# Patient Record
Sex: FEMALE | URBAN - METROPOLITAN AREA
[De-identification: names, ages, dates, MRNs, and addresses within clinical notes are randomized per-mention and may not be internally consistent; named-entity substitution may affect disease eponyms.]

---

## 2021-12-21 ENCOUNTER — EMPLOYEE HEALTH (OUTPATIENT)
Dept: OTHER | Facility: HOSPITAL | Age: 31
End: 2021-12-21
Attending: PREVENTIVE MEDICINE

## 2021-12-21 DIAGNOSIS — Z11.1 SCREENING-PULMONARY TB: Primary | ICD-10-CM

## 2021-12-21 PROCEDURE — 86480 TB TEST CELL IMMUN MEASURE: CPT

## 2022-05-26 ENCOUNTER — TELEPHONE (OUTPATIENT)
Dept: FAMILY MEDICINE CLINIC | Facility: CLINIC | Age: 32
End: 2022-05-26

## 2022-05-26 NOTE — TELEPHONE ENCOUNTER
I spoke with patient regarding NO SHOW status for office visit on 05/26/2022 with Dr. Douglas Cordero. I informed patient our office has a $24.79 NO SHOW FEE POLICY so we ask that you call 24 hours before your appt time. You can also use my-chart to cancel appt before 48 hours before your appointment. Patient will be charged $40.00 for any future NO SHOW appointments. Patient verbalized understanding and agrees.

## 2022-06-01 ENCOUNTER — TELEPHONE (OUTPATIENT)
Dept: FAMILY MEDICINE CLINIC | Facility: CLINIC | Age: 32
End: 2022-06-01

## 2022-06-01 NOTE — TELEPHONE ENCOUNTER
LMOM for Pt regarding NO SHOW status for office visit on 06/01/2022 with Dr. Sybil Lorenzo. I informed patient our office has a $08.83 NO SHOW FEE POLICY so we ask that you call 24 hours before your appt time. You can also use my-chart to cancel appt before 48 hours before your appointment. Patient will be charged $40.00 for any future NO SHOW appointments.

## 2022-10-27 ENCOUNTER — OFFICE VISIT (OUTPATIENT)
Dept: FAMILY MEDICINE CLINIC | Facility: CLINIC | Age: 32
End: 2022-10-27
Payer: COMMERCIAL

## 2022-10-27 VITALS
BODY MASS INDEX: 30.91 KG/M2 | SYSTOLIC BLOOD PRESSURE: 122 MMHG | DIASTOLIC BLOOD PRESSURE: 76 MMHG | WEIGHT: 168 LBS | HEART RATE: 80 BPM | TEMPERATURE: 98 F | RESPIRATION RATE: 18 BRPM | HEIGHT: 62 IN | OXYGEN SATURATION: 99 %

## 2022-10-27 DIAGNOSIS — H65.93 BILATERAL NON-SUPPURATIVE OTITIS MEDIA: Primary | ICD-10-CM

## 2022-10-27 PROCEDURE — 3008F BODY MASS INDEX DOCD: CPT | Performed by: NURSE PRACTITIONER

## 2022-10-27 PROCEDURE — 99213 OFFICE O/P EST LOW 20 MIN: CPT | Performed by: NURSE PRACTITIONER

## 2022-10-27 PROCEDURE — 3078F DIAST BP <80 MM HG: CPT | Performed by: NURSE PRACTITIONER

## 2022-10-27 PROCEDURE — 3074F SYST BP LT 130 MM HG: CPT | Performed by: NURSE PRACTITIONER

## 2022-10-27 RX ORDER — AMOXICILLIN 875 MG/1
875 TABLET, COATED ORAL 2 TIMES DAILY
Qty: 20 TABLET | Refills: 0 | Status: SHIPPED | OUTPATIENT
Start: 2022-10-27 | End: 2022-10-27

## 2022-10-27 RX ORDER — AMOXICILLIN 875 MG/1
875 TABLET, COATED ORAL 2 TIMES DAILY
Qty: 20 TABLET | Refills: 0 | Status: SHIPPED | OUTPATIENT
Start: 2022-10-27 | End: 2022-11-06

## 2022-11-28 ENCOUNTER — IMMUNIZATION (OUTPATIENT)
Dept: FAMILY MEDICINE CLINIC | Facility: CLINIC | Age: 32
End: 2022-11-28
Payer: COMMERCIAL

## 2022-11-28 PROCEDURE — 90471 IMMUNIZATION ADMIN: CPT | Performed by: NURSE PRACTITIONER

## 2022-11-28 PROCEDURE — 90686 IIV4 VACC NO PRSV 0.5 ML IM: CPT | Performed by: NURSE PRACTITIONER

## 2022-12-15 ENCOUNTER — TELEPHONE (OUTPATIENT)
Dept: FAMILY MEDICINE CLINIC | Facility: CLINIC | Age: 32
End: 2022-12-15

## 2022-12-15 DIAGNOSIS — N76.0 ACUTE VAGINITIS: Primary | ICD-10-CM

## 2022-12-15 RX ORDER — FLUCONAZOLE 150 MG/1
150 TABLET ORAL ONCE
Qty: 1 TABLET | Refills: 1 | Status: SHIPPED | OUTPATIENT
Start: 2022-12-15 | End: 2022-12-15

## 2023-11-17 ENCOUNTER — IMMUNIZATION (OUTPATIENT)
Dept: FAMILY MEDICINE CLINIC | Facility: CLINIC | Age: 33
End: 2023-11-17
Payer: COMMERCIAL

## 2023-11-17 DIAGNOSIS — Z23 NEEDS FLU SHOT: Primary | ICD-10-CM

## 2023-11-17 PROCEDURE — 90686 IIV4 VACC NO PRSV 0.5 ML IM: CPT | Performed by: FAMILY MEDICINE

## 2023-11-17 PROCEDURE — 90471 IMMUNIZATION ADMIN: CPT | Performed by: FAMILY MEDICINE

## 2024-04-24 ENCOUNTER — OFFICE VISIT (OUTPATIENT)
Dept: FAMILY MEDICINE CLINIC | Facility: CLINIC | Age: 34
End: 2024-04-24
Payer: COMMERCIAL

## 2024-04-24 VITALS
OXYGEN SATURATION: 99 % | SYSTOLIC BLOOD PRESSURE: 126 MMHG | BODY MASS INDEX: 38 KG/M2 | TEMPERATURE: 98 F | RESPIRATION RATE: 16 BRPM | DIASTOLIC BLOOD PRESSURE: 80 MMHG | HEART RATE: 121 BPM | WEIGHT: 209.81 LBS

## 2024-04-24 DIAGNOSIS — Z13.6 SCREENING FOR CARDIOVASCULAR CONDITION: ICD-10-CM

## 2024-04-24 DIAGNOSIS — R03.0 ELEVATED BP WITHOUT DIAGNOSIS OF HYPERTENSION: Primary | ICD-10-CM

## 2024-04-24 PROCEDURE — 99213 OFFICE O/P EST LOW 20 MIN: CPT | Performed by: FAMILY MEDICINE

## 2024-04-24 NOTE — PROGRESS NOTES
HPI:     Jacquelyn Jerry is a 33 year old female presents for    F/u on high blood pressure.  She is a new patient to the clinic.  He has not seen a physician in about 3 or 4 years.  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  Has noted to use a medium size cuff and her BP has been in the 140/90s range.  When they checked using a purple cuff earlier it was in the 130s today and is normal here.  Overall feels well.  Does admit to eating a high sodium diet.  Has already started to cut back on this.  Plans on losing weight as well.  Has not had blood work in several years and would like this done.  Is unsure on her Tdap status but will look into this.  Last Pap was over 3 years ago.  Thinks she has had 1 abnormal previously but have been normal since.      Medications (Active prior to today's visit):  Current Outpatient Medications   Medication Sig Dispense Refill    valACYclovir 1 G Oral Tab TAKE 1 TABLET BY MOUTH TWICE A DAY ONCE (Patient not taking: Reported on 4/24/2024)      promethazine 6.25 MG/5ML Oral Syrup Take 10 mL by mouth every 6 (six) hours as needed. (Patient not taking: Reported on 4/24/2024)      ibuprofen 800 MG Oral Tab Take 800 mg by mouth every 8 (eight) hours as needed. (Patient not taking: Reported on 4/24/2024)      FLUoxetine 20 MG Oral Cap Take 20 mg by mouth daily. (Patient not taking: Reported on 4/24/2024)      amoxicillin 500 MG Oral Cap Take 500 mg by mouth 3 (three) times daily. (Patient not taking: Reported on 4/24/2024)       Body mass index is 38.37 kg/m².    Allergies:  Not on File    PSFH elements reviewed from today and agreed except as otherwise stated in HPI.  ROS:      Pertinent positives and negatives noted in the the HPI.    PHYSICAL EXAM:     Vitals:    04/24/24 1429   BP: 126/80   BP Location: Left arm   Patient Position: Sitting   Cuff Size: large   Pulse: (!) 121   Resp: 16   Temp: 98 °F (36.7 °C)   TempSrc: Temporal   SpO2: 99%   Weight: 209 lb 12.8 oz (95.2 kg)      Vital signs reviewed.Appears stated age, well groomed.  Physical Exam  Constitutional:       Appearance: Normal appearance.   Cardiovascular:      Rate and Rhythm: Normal rate and regular rhythm.      Pulses: Normal pulses.      Heart sounds: No murmur heard.     No friction rub. No gallop.   Pulmonary:      Effort: Pulmonary effort is normal. No respiratory distress.      Breath sounds: No wheezing, rhonchi or rales.   Abdominal:      General: Bowel sounds are normal. There is no distension.      Palpations: Abdomen is soft.      Tenderness: There is no abdominal tenderness.      Comments: obese   Musculoskeletal:         General: No tenderness.      Cervical back: Neck supple.      Right lower leg: No edema.      Left lower leg: No edema.   Skin:     General: Skin is warm.   Neurological:      General: No focal deficit present.      Mental Status: She is alert.   Psychiatric:         Mood and Affect: Mood normal.         Speech: Speech normal.         Behavior: Behavior normal.          ASSESSMENT/PLAN:   33 year old female with    1. Elevated BP without diagnosis of hypertension    2. Screening for cardiovascular condition        Monitor bp at home, goal < 140/90.  Low sodium diet <2 g per day.  Would not recommend bp medication at this time  Obtain fasting lipids and CMP   Pt will look into her records for her last Tdap  F/u in next few months for complete px or sooner if needed    Patient/Caregiver Education: There are no barriers to learning. Medical education done.   Outcome: Patient verbalizes understanding and agrees with plan. Advised to call or RTC if symptoms persist or worsen.    4/24/2024  Mariana Billings, DO    Patient understands plan and follow-up.

## 2024-04-24 NOTE — PATIENT INSTRUCTIONS
Monitor bp at home, goal < 140/90.  Low sodium diet < 2 g per day     Labs fasting 8 hrs or more    Tdap status?  In past 10 yrs    F/u 1-2 mo for complete px

## 2024-04-27 ENCOUNTER — PATIENT MESSAGE (OUTPATIENT)
Dept: FAMILY MEDICINE CLINIC | Facility: CLINIC | Age: 34
End: 2024-04-27

## 2024-05-01 NOTE — TELEPHONE ENCOUNTER
From: Jacquelyn Jerry  To: Mariana Billings  Sent: 4/27/2024 9:15 AM CDT  Subject: Add Test To Order    Can you check my Estrogen/Progesterone/LH/FSH  Can you add it to my order so I can get it done when I go get my other Blood Work

## 2024-05-02 NOTE — TELEPHONE ENCOUNTER
Pt called office and stated that she did discuss this issue with  just forgot to ask for the lab work. But stated that she will wait to see her and ask her for the order

## 2024-05-07 ENCOUNTER — TELEPHONE (OUTPATIENT)
Dept: FAMILY MEDICINE CLINIC | Facility: CLINIC | Age: 34
End: 2024-05-07

## 2024-05-07 ENCOUNTER — LAB ENCOUNTER (OUTPATIENT)
Dept: LAB | Age: 34
End: 2024-05-07
Attending: FAMILY MEDICINE
Payer: COMMERCIAL

## 2024-05-07 DIAGNOSIS — Z13.29 SCREENING FOR ENDOCRINE, NUTRITIONAL, METABOLIC AND IMMUNITY DISORDER: Primary | ICD-10-CM

## 2024-05-07 DIAGNOSIS — Z13.6 SCREENING FOR CARDIOVASCULAR CONDITION: ICD-10-CM

## 2024-05-07 DIAGNOSIS — Z13.21 SCREENING FOR ENDOCRINE, NUTRITIONAL, METABOLIC AND IMMUNITY DISORDER: Primary | ICD-10-CM

## 2024-05-07 DIAGNOSIS — Z13.0 SCREENING FOR ENDOCRINE, NUTRITIONAL, METABOLIC AND IMMUNITY DISORDER: Primary | ICD-10-CM

## 2024-05-07 DIAGNOSIS — Z13.228 SCREENING FOR ENDOCRINE, NUTRITIONAL, METABOLIC AND IMMUNITY DISORDER: Primary | ICD-10-CM

## 2024-05-07 LAB
ALBUMIN SERPL-MCNC: 3.8 G/DL (ref 3.4–5)
ALBUMIN/GLOB SERPL: 1 {RATIO} (ref 1–2)
ALP LIVER SERPL-CCNC: 47 U/L
ALT SERPL-CCNC: 18 U/L
ANION GAP SERPL CALC-SCNC: 5 MMOL/L (ref 0–18)
AST SERPL-CCNC: 14 U/L (ref 15–37)
BILIRUB SERPL-MCNC: 0.9 MG/DL (ref 0.1–2)
BUN BLD-MCNC: 7 MG/DL (ref 9–23)
CALCIUM BLD-MCNC: 8.8 MG/DL (ref 8.5–10.1)
CHLORIDE SERPL-SCNC: 109 MMOL/L (ref 98–112)
CHOLEST SERPL-MCNC: 173 MG/DL (ref ?–200)
CO2 SERPL-SCNC: 24 MMOL/L (ref 21–32)
CREAT BLD-MCNC: 0.92 MG/DL
EGFRCR SERPLBLD CKD-EPI 2021: 85 ML/MIN/1.73M2 (ref 60–?)
FASTING PATIENT LIPID ANSWER: YES
FASTING STATUS PATIENT QL REPORTED: YES
GLOBULIN PLAS-MCNC: 3.9 G/DL (ref 2.8–4.4)
GLUCOSE BLD-MCNC: 83 MG/DL (ref 70–99)
HDLC SERPL-MCNC: 48 MG/DL (ref 40–59)
LDLC SERPL CALC-MCNC: 110 MG/DL (ref ?–100)
NONHDLC SERPL-MCNC: 125 MG/DL (ref ?–130)
OSMOLALITY SERPL CALC.SUM OF ELEC: 283 MOSM/KG (ref 275–295)
POTASSIUM SERPL-SCNC: 3.7 MMOL/L (ref 3.5–5.1)
PROT SERPL-MCNC: 7.7 G/DL (ref 6.4–8.2)
SODIUM SERPL-SCNC: 138 MMOL/L (ref 136–145)
TRIGL SERPL-MCNC: 81 MG/DL (ref 30–149)
VLDLC SERPL CALC-MCNC: 14 MG/DL (ref 0–30)

## 2024-05-07 PROCEDURE — 36415 COLL VENOUS BLD VENIPUNCTURE: CPT

## 2024-05-07 PROCEDURE — 80053 COMPREHEN METABOLIC PANEL: CPT

## 2024-05-07 PROCEDURE — 80061 LIPID PANEL: CPT

## 2024-05-07 NOTE — TELEPHONE ENCOUNTER
Pt called, states she is at the lab right now and had a CMP and lipid panel drawn. However, she though she needed a CBC as well.     Tech meliza additional tube incase PCP wanted to order CBC for existing specimen.    Order pended for provider review and signature.

## 2024-05-08 ENCOUNTER — TELEPHONE (OUTPATIENT)
Dept: FAMILY MEDICINE CLINIC | Facility: CLINIC | Age: 34
End: 2024-05-08

## 2024-05-08 NOTE — TELEPHONE ENCOUNTER
----- Message from Mariana Billings sent at 5/8/2024  9:28 AM CDT -----  Please notify that labs show LDL or bad cholesterol mildly elevated.  Recommend decreasing saturated fat in diet as well as increasing fish intake.  Could also take otc fish oil or omega 3 fatty acid supplement to help lower level.  Remainder of blood work normal.

## 2024-05-08 NOTE — TELEPHONE ENCOUNTER
Spoke to pt informed of test results and provider indications. Discussed diet and avoiding fatty metas like pork, beef, lamb. Pt voiced understanding. Will start to take Omega 3 supplements and states she has been working on her diet and trying to loose weight.

## 2024-05-08 NOTE — TELEPHONE ENCOUNTER
Pt informed, no CBC order requested at this time. Pt has apt for annual physical 05/14/2024. Likely to have physical labs ordered as part of physical which typically include CBC. Pt voiced understanding. Will discuss with PCP at time of appointment

## 2024-05-08 NOTE — TELEPHONE ENCOUNTER
No need for CBC.  See her OV note plan per Dr. Billings's note:    . Elevated BP without diagnosis of hypertension    2. Screening for cardiovascular condition          Monitor bp at home, goal < 140/90.  Low sodium diet <2 g per day.  Would not recommend bp medication at this time  Obtain fasting lipids and CMP   Pt will look into her records for her last Tdap  F/u in next few months for complete px or sooner if needed  SHAHANA nicholson.

## 2024-05-14 ENCOUNTER — LAB ENCOUNTER (OUTPATIENT)
Dept: LAB | Age: 34
End: 2024-05-14
Attending: FAMILY MEDICINE

## 2024-05-14 ENCOUNTER — OFFICE VISIT (OUTPATIENT)
Dept: FAMILY MEDICINE CLINIC | Facility: CLINIC | Age: 34
End: 2024-05-14

## 2024-05-14 ENCOUNTER — TELEPHONE (OUTPATIENT)
Dept: FAMILY MEDICINE CLINIC | Facility: CLINIC | Age: 34
End: 2024-05-14

## 2024-05-14 VITALS
WEIGHT: 206.81 LBS | RESPIRATION RATE: 19 BRPM | BODY MASS INDEX: 38.06 KG/M2 | HEIGHT: 62 IN | TEMPERATURE: 97 F | OXYGEN SATURATION: 99 % | HEART RATE: 105 BPM | DIASTOLIC BLOOD PRESSURE: 80 MMHG | SYSTOLIC BLOOD PRESSURE: 130 MMHG

## 2024-05-14 DIAGNOSIS — Z13.6 SCREENING FOR ISCHEMIC HEART DISEASE: ICD-10-CM

## 2024-05-14 DIAGNOSIS — Z13.21 SCREENING FOR ENDOCRINE, NUTRITIONAL, METABOLIC AND IMMUNITY DISORDER: ICD-10-CM

## 2024-05-14 DIAGNOSIS — Z13.29 SCREENING FOR ENDOCRINE, NUTRITIONAL, METABOLIC AND IMMUNITY DISORDER: ICD-10-CM

## 2024-05-14 DIAGNOSIS — N94.6 MENSTRUAL CRAMPS: ICD-10-CM

## 2024-05-14 DIAGNOSIS — Z00.00 ANNUAL PHYSICAL EXAM: ICD-10-CM

## 2024-05-14 DIAGNOSIS — L67.8 ABNORMAL DIRECTION OF HAIR GROWTH: ICD-10-CM

## 2024-05-14 DIAGNOSIS — Z12.4 SCREENING FOR CERVICAL CANCER: ICD-10-CM

## 2024-05-14 DIAGNOSIS — Z11.3 SCREEN FOR STD (SEXUALLY TRANSMITTED DISEASE): ICD-10-CM

## 2024-05-14 DIAGNOSIS — Z13.0 SCREENING FOR ENDOCRINE, NUTRITIONAL, METABOLIC AND IMMUNITY DISORDER: ICD-10-CM

## 2024-05-14 DIAGNOSIS — Z23 NEED FOR VACCINATION: Primary | ICD-10-CM

## 2024-05-14 DIAGNOSIS — F41.9 ANXIETY: ICD-10-CM

## 2024-05-14 DIAGNOSIS — Z13.228 SCREENING FOR ENDOCRINE, NUTRITIONAL, METABOLIC AND IMMUNITY DISORDER: ICD-10-CM

## 2024-05-14 DIAGNOSIS — Z00.00 ANNUAL PHYSICAL EXAM: Primary | ICD-10-CM

## 2024-05-14 LAB
BASOPHILS # BLD AUTO: 0.03 X10(3) UL (ref 0–0.2)
BASOPHILS NFR BLD AUTO: 0.6 %
DHEA-S SERPL-MCNC: 337.3 UG/DL
EOSINOPHIL # BLD AUTO: 0.16 X10(3) UL (ref 0–0.7)
EOSINOPHIL NFR BLD AUTO: 3.1 %
ERYTHROCYTE [DISTWIDTH] IN BLOOD BY AUTOMATED COUNT: 12.5 %
FSH SERPL-ACNC: 7.9 MIU/ML
HBV SURFACE AB SER QL: REACTIVE
HBV SURFACE AB SERPL IA-ACNC: 200.71 MIU/ML
HBV SURFACE AG SER-ACNC: <0.1 [IU]/L
HBV SURFACE AG SERPL QL IA: NONREACTIVE
HCT VFR BLD AUTO: 46.5 %
HCV AB SERPL QL IA: NONREACTIVE
HGB BLD-MCNC: 16.4 G/DL
IMM GRANULOCYTES # BLD AUTO: 0.01 X10(3) UL (ref 0–1)
IMM GRANULOCYTES NFR BLD: 0.2 %
LH SERPL-ACNC: 7 MIU/ML
LYMPHOCYTES # BLD AUTO: 2.44 X10(3) UL (ref 1–4)
LYMPHOCYTES NFR BLD AUTO: 47.3 %
MCH RBC QN AUTO: 32.7 PG (ref 26–34)
MCHC RBC AUTO-ENTMCNC: 35.3 G/DL (ref 31–37)
MCV RBC AUTO: 92.8 FL
MONOCYTES # BLD AUTO: 0.31 X10(3) UL (ref 0.1–1)
MONOCYTES NFR BLD AUTO: 6 %
NEUTROPHILS # BLD AUTO: 2.21 X10 (3) UL (ref 1.5–7.7)
NEUTROPHILS # BLD AUTO: 2.21 X10(3) UL (ref 1.5–7.7)
NEUTROPHILS NFR BLD AUTO: 42.8 %
PLATELET # BLD AUTO: 346 10(3)UL (ref 150–450)
PROLACTIN SERPL-MCNC: 6.7 NG/ML
RBC # BLD AUTO: 5.01 X10(6)UL
T PALLIDUM AB SER QL IA: NONREACTIVE
TSI SER-ACNC: 0.78 MIU/ML (ref 0.36–3.74)
WBC # BLD AUTO: 5.2 X10(3) UL (ref 4–11)

## 2024-05-14 PROCEDURE — 87491 CHLMYD TRACH DNA AMP PROBE: CPT | Performed by: FAMILY MEDICINE

## 2024-05-14 PROCEDURE — 84143 ASSAY OF 17-HYDROXYPREGNENO: CPT

## 2024-05-14 PROCEDURE — 99214 OFFICE O/P EST MOD 30 MIN: CPT | Performed by: FAMILY MEDICINE

## 2024-05-14 PROCEDURE — 87624 HPV HI-RISK TYP POOLED RSLT: CPT | Performed by: FAMILY MEDICINE

## 2024-05-14 PROCEDURE — 87591 N.GONORRHOEAE DNA AMP PROB: CPT | Performed by: FAMILY MEDICINE

## 2024-05-14 PROCEDURE — 87389 HIV-1 AG W/HIV-1&-2 AB AG IA: CPT

## 2024-05-14 PROCEDURE — 84443 ASSAY THYROID STIM HORMONE: CPT

## 2024-05-14 PROCEDURE — 88175 CYTOPATH C/V AUTO FLUID REDO: CPT | Performed by: FAMILY MEDICINE

## 2024-05-14 PROCEDURE — 36415 COLL VENOUS BLD VENIPUNCTURE: CPT

## 2024-05-14 PROCEDURE — 83002 ASSAY OF GONADOTROPIN (LH): CPT

## 2024-05-14 PROCEDURE — 82627 DEHYDROEPIANDROSTERONE: CPT

## 2024-05-14 PROCEDURE — 99395 PREV VISIT EST AGE 18-39: CPT | Performed by: FAMILY MEDICINE

## 2024-05-14 PROCEDURE — 87340 HEPATITIS B SURFACE AG IA: CPT

## 2024-05-14 PROCEDURE — 84146 ASSAY OF PROLACTIN: CPT

## 2024-05-14 PROCEDURE — 84403 ASSAY OF TOTAL TESTOSTERONE: CPT

## 2024-05-14 PROCEDURE — 84402 ASSAY OF FREE TESTOSTERONE: CPT

## 2024-05-14 PROCEDURE — 85025 COMPLETE CBC W/AUTO DIFF WBC: CPT

## 2024-05-14 PROCEDURE — 83001 ASSAY OF GONADOTROPIN (FSH): CPT

## 2024-05-14 PROCEDURE — 86780 TREPONEMA PALLIDUM: CPT

## 2024-05-14 PROCEDURE — 86803 HEPATITIS C AB TEST: CPT

## 2024-05-14 PROCEDURE — 86706 HEP B SURFACE ANTIBODY: CPT

## 2024-05-14 RX ORDER — ALPRAZOLAM 0.25 MG/1
0.25 TABLET ORAL NIGHTLY PRN
Qty: 30 TABLET | Refills: 0 | Status: SHIPPED | OUTPATIENT
Start: 2024-05-14

## 2024-05-14 RX ORDER — SERTRALINE HYDROCHLORIDE 25 MG/1
25 TABLET, FILM COATED ORAL DAILY
Qty: 30 TABLET | Refills: 0 | Status: SHIPPED | OUTPATIENT
Start: 2024-05-14

## 2024-05-14 RX ORDER — IBUPROFEN 200 MG
200 TABLET ORAL EVERY 6 HOURS PRN
Qty: 30 TABLET | Refills: 0 | Status: SHIPPED | OUTPATIENT
Start: 2024-05-14

## 2024-05-14 RX ORDER — IBUPROFEN 400 MG/1
400 TABLET ORAL EVERY 8 HOURS PRN
Qty: 30 TABLET | Refills: 0 | Status: CANCELLED | OUTPATIENT
Start: 2024-05-14

## 2024-05-14 NOTE — PATIENT INSTRUCTIONS
Blood work fasting 8 hrs or more       Xanax only as needed     Zoloft 25 mg daily     F/u 1 mo for med check

## 2024-05-14 NOTE — PROGRESS NOTES
Subjective:   Jacquelyn Jerry is a 33 year old female who presents for complete px.  Has multiple complaints.     The patient reports:  Is under a lot of stress.  Her almost 16 yr old daughter ran away.  Is doing EAP through work due to anxiety.  Knows daughter is in [place], but doesn't know where.  Her previous PCP had her on zoloft and prn xanax.  \"I'm not a medicine person\".  Didn't like the concept of taking zoloft so only took 5 xanax previously and bottle .  Isn't sleeping right now.  Would like refill of xanax to use as needed to help w/ sleep.  Does admit her knee shakes while at work and is anxious.  Has had panic attacks before.      Also wants her hormones checked.  Tells me she has abnormal hair growth on her face.  Difficulty losing weight.  Some months skips menstrual cycles.  No one has looked into this before.      Also wants to be tested for STDs    Also brings up after pap smear is done today she wants us to prescribe ibuprofen for her menstrual cramps since she can't afford to pay for it otc.  States her old PCP would do this and insurance would cover it.  Tells me he was giving her ibuprofen 800 mg tablets for menstrual cramps.     History:   LMP: Patient's last menstrual period was 2024 (exact date).  Last pap date: 4 years ago   Abnormal pap?  yes  : 4  Para: 1  Pre-eclampsia on magnesium drip     History/Other:    Chief Complaint Reviewed and Verified  Nursing Notes Reviewed and   Verified  Tobacco Reviewed  Allergies Reviewed  Medications Reviewed    Problem List Reviewed  Medical History Reviewed  Surgical History   Reviewed  OB Status Reviewed  Social History Reviewed         Tobacco:  She has never smoked tobacco.    Current Outpatient Medications   Medication Sig Dispense Refill    ALPRAZolam (XANAX) 0.25 MG Oral Tab Take 1 tablet (0.25 mg total) by mouth nightly as needed. 30 tablet 0    sertraline 25 MG Oral Tab Take 1 tablet (25 mg total) by  mouth daily. 30 tablet 0    ibuprofen 200 MG Oral Tab Take 1 tablet (200 mg total) by mouth every 6 (six) hours as needed for Pain. 30 tablet 0       Review of Systems:  See HPI     Objective:   /80 (BP Location: Left arm, Patient Position: Sitting, Cuff Size: large)   Pulse 105   Temp 97.4 °F (36.3 °C) (Temporal)   Resp 19   Ht 5' 2\" (1.575 m)   Wt 206 lb 12.8 oz (93.8 kg)   LMP 05/05/2024 (Exact Date)   SpO2 99%   BMI 37.82 kg/m²  Estimated body mass index is 37.82 kg/m² as calculated from the following:    Height as of this encounter: 5' 2\" (1.575 m).    Weight as of this encounter: 206 lb 12.8 oz (93.8 kg).  Physical Exam  Constitutional:       Appearance: Normal appearance.   HENT:      Right Ear: Tympanic membrane and ear canal normal.      Left Ear: Tympanic membrane and ear canal normal.      Mouth/Throat:      Mouth: Mucous membranes are moist.      Pharynx: Oropharynx is clear.   Eyes:      Pupils: Pupils are equal, round, and reactive to light.   Cardiovascular:      Rate and Rhythm: Normal rate and regular rhythm.      Pulses: Normal pulses.      Heart sounds: No murmur heard.     No friction rub. No gallop.   Pulmonary:      Effort: Pulmonary effort is normal. No respiratory distress.      Breath sounds: No wheezing, rhonchi or rales.   Chest:   Breasts:     Right: No mass, nipple discharge, skin change or tenderness.      Left: No mass, nipple discharge, skin change or tenderness.   Abdominal:      General: Bowel sounds are normal. There is no distension.      Palpations: Abdomen is soft.      Tenderness: There is no abdominal tenderness.      Comments: Obese    Musculoskeletal:         General: No tenderness.      Cervical back: Neck supple.      Right lower leg: No edema.      Left lower leg: No edema.   Lymphadenopathy:      Cervical: No cervical adenopathy.   Skin:     General: Skin is warm.   Neurological:      General: No focal deficit present.      Mental Status: She is alert.    Psychiatric:         Mood and Affect: Mood is anxious.         Speech: Speech normal.         Behavior: Behavior normal.       Assessment & Plan:   1. Annual physical exam    2. Anxiety    3. Menstrual cramps    4. Abnormal direction of hair growth    5. Screening for ischemic heart disease    6. Screening for endocrine, nutritional, metabolic and immunity disorder    7. Screening for cervical cancer    8. Screen for STD (sexually transmitted disease)          Screening labs-already had recent lipids and cmp done.  Pt requested CBC.  Pap/HPV pending today  Irreg menses and abnormal hair growth w/ uncertain prognosis-obtain labs to eval for PCOS-prolactin, TSH, FSH, LH, total and free testosterone, DHEA-S, 17 hydroxyprogesterone   Std screening-HIV, hepatitis panel, RPR, GC/CT through the urine  Do think pt likely has uncontrolled anxiety w/ exacerbation.  We discussed different treatment options.  I would recommend zoloft 25 mg daily.  Explained it takes 4-6 wks to see full effect of medication and not to stop abruptly due to risk of serotonin withdrawal.  Did send in small amnt of xanax for pt to use prn.  She is not to drive or operate heavy machinery on this medication.  I did tell her I would not refill the xanax after this 1 time script  Menstrual cramps w/ exacerbation.  Explained to pt ibuprofen 800 mg is the surgical dosing of ibuprofen and too strong of a dose for menstrual cramps.  She originally had told me she wanted me to send in ibuprofen as she couldn't afford it but then stated she wanted 800 mg daily.  I told her I was only comfortable sending in the normal 200 mg dose to help with her finances.  If she needs something stronger for her menstrual cramps, she will need to see gyne to discuss options and figure out reasons why they are so painful.  Also discussed risks of GI bleed with this  Immunizations-pt believes her Tdap is UTD.  Did do release of records today for this  Patient counseling:  nutrition: stressed importance of moderation in sodium/caffeine intake, saturated fat and cholesterol, caloric balance, sufficient intake of fresh fruits, vegetables, fiber, calcium, iron.  Exercise: stressed the importance of regular exercise  F/u 1 mo for med check on zoloft or sooner if needed    Mariana Billings DO, 05/14/24, 11:24 AM

## 2024-05-14 NOTE — TELEPHONE ENCOUNTER
Faxed medical request to Dr Rigoberto Turner to [phone] asking for patients immunization record.     Received a fax back stating that they do not have any record of vaccinations and the patients first visit with them was 03/27/2008.

## 2024-05-15 ENCOUNTER — NURSE ONLY (OUTPATIENT)
Dept: FAMILY MEDICINE CLINIC | Facility: CLINIC | Age: 34
End: 2024-05-15

## 2024-05-15 ENCOUNTER — MED REC SCAN ONLY (OUTPATIENT)
Dept: FAMILY MEDICINE CLINIC | Facility: CLINIC | Age: 34
End: 2024-05-15

## 2024-05-15 DIAGNOSIS — Z23 NEED FOR TDAP VACCINATION: Primary | ICD-10-CM

## 2024-05-15 LAB
C TRACH DNA SPEC QL NAA+PROBE: NEGATIVE
HPV I/H RISK 1 DNA SPEC QL NAA+PROBE: NEGATIVE
N GONORRHOEA DNA SPEC QL NAA+PROBE: NEGATIVE

## 2024-05-15 PROCEDURE — 90715 TDAP VACCINE 7 YRS/> IM: CPT | Performed by: NURSE PRACTITIONER

## 2024-05-15 PROCEDURE — 90471 IMMUNIZATION ADMIN: CPT | Performed by: NURSE PRACTITIONER

## 2024-05-15 NOTE — TELEPHONE ENCOUNTER
Called pt, informed of PCP recommendation.    Pt states she works for an [employer] [facility] and can get it done there.     Order pended for provider review and signature.

## 2024-05-16 ENCOUNTER — TELEPHONE (OUTPATIENT)
Dept: FAMILY MEDICINE CLINIC | Facility: CLINIC | Age: 34
End: 2024-05-16

## 2024-05-16 DIAGNOSIS — N94.6 MENSTRUAL CRAMPS: Primary | ICD-10-CM

## 2024-05-16 NOTE — TELEPHONE ENCOUNTER
Pt told me she needed me to prescribe ibuprofen for her since she couldn't pay for it otc.  I told her I was only comfortable prescribing the otc dose which is 200 mg as needed.  She said her FSA covered it if I prescribed it so she didn't need to buy it otc.  I am not increasing the dose.  She should see gyne for her bad menstrual cramps to discuss options.  Referral ready.

## 2024-05-16 NOTE — TELEPHONE ENCOUNTER
Pt called, states she saw PCP recently and was told she would get a script for ibuprofen 400 mg. She was prescribed ibuprofen 200 mg, but they are not covered. Needs the 400 mg tabs sent to [facility].    Per 5/14/2024 OV notes: I told her I was only comfortable sending in the normal 200 mg dose to help with her finances. If she needs something stronger for her menstrual cramps, she will need to see gyne to discuss options and figure out reasons why they are so painful. Also discussed risks of GI bleed with this     GYN referral pended for provider review and signature.

## 2024-05-17 LAB
.: NORMAL
.: NORMAL
17-OH PROGESTERONE: 43 NG/DL

## 2024-05-20 DIAGNOSIS — L67.8 ABNORMAL DIRECTION OF HAIR GROWTH: Primary | ICD-10-CM

## 2024-05-20 DIAGNOSIS — R79.89 ELEVATED TESTOSTERONE LEVEL: ICD-10-CM

## 2024-05-20 DIAGNOSIS — N94.6 MENSTRUAL CRAMPS: ICD-10-CM

## 2024-05-20 DIAGNOSIS — N92.6 IRREGULAR MENSES: ICD-10-CM

## 2024-05-20 LAB
FREE TESTOST DIRECT: 5.5 PG/ML
TESTOSTERONE: 50 NG/DL

## 2024-05-22 RX ORDER — ONDANSETRON 4 MG/1
4 TABLET, ORALLY DISINTEGRATING ORAL EVERY 8 HOURS PRN
Qty: 20 TABLET | Refills: 0 | Status: SHIPPED | OUTPATIENT
Start: 2024-05-22

## 2024-05-22 NOTE — TELEPHONE ENCOUNTER
Spoke to pt, informed of PCP indications regarding ibuprofen. Discussed if menstrual pains are bad,  recommends establishing care with gyne, and pt is agreeable. Would like referral info sent via my-chart.     States she also would like to discuss starting birth control so will look to establish care and address both concerns with gyne.     My-chart message sent

## 2024-06-27 ENCOUNTER — TELEPHONE (OUTPATIENT)
Dept: FAMILY MEDICINE CLINIC | Facility: CLINIC | Age: 34
End: 2024-06-27

## 2024-06-27 DIAGNOSIS — E66.9 OBESITY, UNSPECIFIED CLASSIFICATION, UNSPECIFIED OBESITY TYPE, UNSPECIFIED WHETHER SERIOUS COMORBIDITY PRESENT: Primary | ICD-10-CM

## 2024-06-27 NOTE — TELEPHONE ENCOUNTER
Spoke with patient she was calling to update Dr. Billings that she is doing well on wegovy 0.5mg.  She will take her third dose tomorrow. She would like to move up to the next dose 1mg and sent to pharmacy just in case there are any supply issues. Patient would like a [employer] message when prescription has been sent.     Prescription pending.    Sent to Dr. Billings for review and signing of prescription.

## 2024-07-02 NOTE — TELEPHONE ENCOUNTER
Patient scheduled for 07/05/24 for medication follow up. Pt has one more 0.5 mg dose due next Thursday

## 2024-07-05 ENCOUNTER — OFFICE VISIT (OUTPATIENT)
Dept: FAMILY MEDICINE CLINIC | Facility: CLINIC | Age: 34
End: 2024-07-05
Payer: COMMERCIAL

## 2024-07-05 VITALS
HEART RATE: 97 BPM | WEIGHT: 199.81 LBS | OXYGEN SATURATION: 98 % | TEMPERATURE: 98 F | RESPIRATION RATE: 20 BRPM | BODY MASS INDEX: 37 KG/M2 | SYSTOLIC BLOOD PRESSURE: 126 MMHG | DIASTOLIC BLOOD PRESSURE: 88 MMHG

## 2024-07-05 DIAGNOSIS — Z71.3 WEIGHT LOSS COUNSELING, ENCOUNTER FOR: ICD-10-CM

## 2024-07-05 DIAGNOSIS — E66.9 OBESITY, UNSPECIFIED CLASSIFICATION, UNSPECIFIED OBESITY TYPE, UNSPECIFIED WHETHER SERIOUS COMORBIDITY PRESENT: Primary | ICD-10-CM

## 2024-07-05 PROCEDURE — 99213 OFFICE O/P EST LOW 20 MIN: CPT | Performed by: FAMILY MEDICINE

## 2024-07-05 NOTE — PATIENT INSTRUCTIONS
Wegovy 1 mg subcutaneous weekly going forward     Take one more week of the 0.5 mg subcutaneously     F/u 5 weeks for med check

## 2024-07-05 NOTE — PROGRESS NOTES
HPI:     Jacquelyn Jerry is a 33 year old female presents for    Weight loss f/u.  Was seen 3 weeks ago and had dose of wegovy increased to 0.5 mg subcutaneous weekly.  Previously was going to an online membership program-\"[facility]\" for weight loss medication who started her on the medication and she asked that we take over managing it.  Has 1 more 0.5 mg weekly injection.  Has lost about 4 pounds since her visit last month.  Admits to this week getting her menstrual cycle and having more PMS so eating bad foods like chocolate sundaes.  Otherwise has been trying to eat better.  Was maybe slightly constipated at first but this is since improved.      Medications (Active prior to today's visit):  Current Outpatient Medications   Medication Sig Dispense Refill    semaglutide-weight management 1 MG/0.5ML Subcutaneous Solution Auto-injector Inject 0.5 mL (1 mg total) into the skin once a week for 4 doses. 2 mL 0       Allergies:  No Known Allergies    PSFH elements reviewed from today and agreed except as otherwise stated in HPI.  ROS:      Pertinent positives and negatives noted in the the HPI.    PHYSICAL EXAM:     Vitals:    07/05/24 0839   BP: 126/88   BP Location: Right arm   Patient Position: Sitting   Cuff Size: large   Pulse: 97   Resp: 20   Temp: 97.5 °F (36.4 °C)   TempSrc: Temporal   SpO2: 98%   Weight: 199 lb 12.8 oz (90.6 kg)     Vital signs reviewed.Appears stated age, well groomed.  Physical Exam  Constitutional:       Appearance: Normal appearance. She is well-developed and well-groomed.   Cardiovascular:      Rate and Rhythm: Normal rate and regular rhythm.      Pulses: Normal pulses.      Heart sounds: No murmur heard.     No friction rub. No gallop.   Pulmonary:      Effort: Pulmonary effort is normal. No respiratory distress.      Breath sounds: No wheezing, rhonchi or rales.   Abdominal:      General: Bowel sounds are normal. There is no distension.      Palpations: Abdomen is soft.       Tenderness: There is no abdominal tenderness.      Comments: Obese    Musculoskeletal:         General: No tenderness.      Right lower leg: No edema.      Left lower leg: No edema.   Skin:     General: Skin is warm.   Neurological:      General: No focal deficit present.      Mental Status: She is alert.   Psychiatric:         Mood and Affect: Mood normal.         Speech: Speech normal.         Behavior: Behavior normal. Behavior is cooperative.          ASSESSMENT/PLAN:   33 year old female with    1. Obesity, unspecified classification, unspecified obesity type, unspecified whether serious comorbidity present    2. Weight loss counseling, encounter for        Obesity stable.  Is losing weight actively on wegovy.  Has one more dose of 0.5 mg subcutaneous weekly.  We will then increase dose to 1 mg subcutaneous weekly thereafter.    F/u 4 weeks for a med check or sooner if needed      Patient/Caregiver Education: There are no barriers to learning. Medical education done.   Outcome: Patient verbalizes understanding and agrees with plan. Advised to call or RTC if symptoms persist or worsen.    Mariana Billings DO, 07/05/24, 9:14 AM    Patient understands plan and follow-up.

## 2024-08-02 ENCOUNTER — OFFICE VISIT (OUTPATIENT)
Dept: FAMILY MEDICINE CLINIC | Facility: CLINIC | Age: 34
End: 2024-08-02
Payer: COMMERCIAL

## 2024-08-02 VITALS
RESPIRATION RATE: 21 BRPM | WEIGHT: 199.19 LBS | BODY MASS INDEX: 35.74 KG/M2 | HEART RATE: 104 BPM | TEMPERATURE: 97 F | DIASTOLIC BLOOD PRESSURE: 76 MMHG | SYSTOLIC BLOOD PRESSURE: 128 MMHG | HEIGHT: 62.5 IN | OXYGEN SATURATION: 98 %

## 2024-08-02 DIAGNOSIS — E66.9 OBESITY, UNSPECIFIED CLASSIFICATION, UNSPECIFIED OBESITY TYPE, UNSPECIFIED WHETHER SERIOUS COMORBIDITY PRESENT: Primary | ICD-10-CM

## 2024-08-02 DIAGNOSIS — Z71.3 WEIGHT LOSS COUNSELING, ENCOUNTER FOR: ICD-10-CM

## 2024-08-02 PROCEDURE — 99214 OFFICE O/P EST MOD 30 MIN: CPT | Performed by: FAMILY MEDICINE

## 2024-08-02 NOTE — PROGRESS NOTES
HPI:     Jacquelyn Jerry is a 33 year old female presents for    Medication follow-up.  She was seen 1 month ago and had her dose of Wegovy increased to 1 mg subcutaneous weekly.  No side effects with the medication.  Has not lost a single pound since visit last month.  Is under a lot of stress.  15-year-old daughter is currently inpatient at [facility].  Patient has been more stress eating and not doing her normal regimen including eating healthier and working out.  According to patient's scale she is down to 195.  Believes today's weight is a reflection on the fact that she is supposed to start her menses today.  Believes 4 lbs is due to water weight associated with her menstrual cycle.  Would like to go up on dose of Wegovy.        Medications (Active prior to today's visit):  Current Outpatient Medications   Medication Sig Dispense Refill    semaglutide-weight management 1 MG/0.5ML Subcutaneous Solution Auto-injector Inject 0.5 mL (1 mg total) into the skin once a week.         Allergies:  No Known Allergies    PSFH elements reviewed from today and agreed except as otherwise stated in HPI.  ROS:      Pertinent positives and negatives noted in the the HPI.    PHYSICAL EXAM:     Vitals:    08/02/24 0801   BP: 128/76   BP Location: Left arm   Patient Position: Sitting   Cuff Size: large   Pulse: 104   Resp: 21   Temp: 97.1 °F (36.2 °C)   TempSrc: Temporal   SpO2: 98%   Weight: 199 lb 3.2 oz (90.4 kg)   Height: 5' 2.5\" (1.588 m)     Vital signs reviewed.Appears stated age, well groomed.  Physical Exam  Constitutional:       Appearance: Normal appearance.   Cardiovascular:      Rate and Rhythm: Normal rate and regular rhythm.      Pulses: Normal pulses.      Heart sounds: No murmur heard.     No friction rub. No gallop.   Pulmonary:      Effort: Pulmonary effort is normal. No respiratory distress.      Breath sounds: No wheezing, rhonchi or rales.   Abdominal:      General: Bowel sounds  are normal. There is no distension.      Palpations: Abdomen is soft.      Tenderness: There is no abdominal tenderness.      Comments: obese   Musculoskeletal:         General: No tenderness.      Cervical back: Neck supple.      Right lower leg: No edema.      Left lower leg: No edema.   Lymphadenopathy:      Cervical: No cervical adenopathy.   Skin:     General: Skin is warm.   Neurological:      General: No focal deficit present.      Mental Status: She is alert.   Psychiatric:         Mood and Affect: Mood normal.         Speech: Speech normal.         Behavior: Behavior normal.          ASSESSMENT/PLAN:   33 year old female with    1. Obesity, unspecified classification, unspecified obesity type, unspecified whether serious comorbidity present    2. Weight loss counseling, encounter for      Obesity with exacerbation discussed goals of weight loss with medication.  Will increase Wegovy to 1.7 mg subcutaneous weekly.  Really needs to make a focused effort at eating better.  If does not lose weight at next visit and weight stays stagnant, will recommend follow-up with the weight loss clinic  Follow-up in 1 month for med check or sooner if needed    Patient/Caregiver Education: There are no barriers to learning. Medical education done.   Outcome: Patient verbalizes understanding and agrees with plan. Advised to call or RTC if symptoms persist or worsen.        8/2/2024  Mariana Billings, DO      Patient understands plan and follow-up.  No follow-ups on file.

## 2024-08-05 ENCOUNTER — TELEPHONE (OUTPATIENT)
Dept: FAMILY MEDICINE CLINIC | Facility: CLINIC | Age: 34
End: 2024-08-05

## 2024-08-05 ENCOUNTER — TELEPHONE (OUTPATIENT)
Dept: OBGYN CLINIC | Facility: CLINIC | Age: 34
End: 2024-08-05

## 2024-08-05 DIAGNOSIS — N91.2 AMENORRHEA: Primary | ICD-10-CM

## 2024-08-05 DIAGNOSIS — Z34.90 PREGNANCY, UNSPECIFIED GESTATIONAL AGE (HCC): Primary | ICD-10-CM

## 2024-08-05 NOTE — TELEPHONE ENCOUNTER
Patient calling to initiate prenatal care  LMP 07/06/24  Patient is 7-8 weeks on 07/31/24  Confirmation Ultrasound and Appointment scheduled on   Future Appointments   Date Time Provider Department Center   8/12/2024  1:30 PM Ana Luisa Alston DO [id number]   9/9/2024  9:45 AM [facility] [address]   9/9/2024 10:30 AM Yang Lee MD [facility] [address]                   Any history of ectopic pregnancy? N  Any history of miscarriage? N  Any medications that you are taking on a regular basis other than prenatal vitamins? WEGOVY 1.7MG-DATE DOSE 08/01 AND HAS D/C (if not taking prenatal vitamins, encourage patient to start taking.)  Any bleeding since the first day of last LMP and your positive pregnancy test? N/A    Insurance [employer]

## 2024-08-05 NOTE — TELEPHONE ENCOUNTER
Spoke to pt pt requesting assistance with scheduling appointment.     Outreach call to OBGYN, requested apt scheduled for pt. Pt would be scheduled around 8-9 weeks (08/31/2024)  Next available is 09/09/2024 pt will be 9 weeks at time of apt.   Apt scheduled at [facility]. Will have US at 945 followed by MD consult 1030.    Jacquelyn notified

## 2024-08-05 NOTE — TELEPHONE ENCOUNTER
Pt notified to discontinue Wegovy, not recommended during pregnancy. Pt voiced understanding. States \"I know already know that\".   Also provided with referral information to . Pt should call to establish care. Apt for 08/07/2024 with Dr.Muhr ramirezed.

## 2024-08-05 NOTE — TELEPHONE ENCOUNTER
Referral written for OB/GYN.  Needs to stop Wegovy at this time and cannot resume while pregnant.

## 2024-08-05 NOTE — TELEPHONE ENCOUNTER
Outreach call to pt to confirm reason for scheduled visit 08/07/2024.   Pt states she scheduled appointment to confirm pregnancy. Pt states she had a positive pregnancy test at home yesterday. Last LMP 07/06/2024.     Pt on semaglutide 1.7 mg once a week. She had her last dose Thursday 08/01/2024, please advice on medication moving forward since pt had positive pregnancy test.     Will also need OBGYN referral    Referral pended sent to PCP for review and advice on patient medication

## 2024-08-23 ENCOUNTER — PATIENT MESSAGE (OUTPATIENT)
Dept: FAMILY MEDICINE CLINIC | Facility: CLINIC | Age: 34
End: 2024-08-23

## 2024-08-23 ENCOUNTER — TELEPHONE (OUTPATIENT)
Dept: FAMILY MEDICINE CLINIC | Facility: CLINIC | Age: 34
End: 2024-08-23

## 2024-08-23 RX ORDER — SEMAGLUTIDE 1.7 MG/.75ML
1.7 INJECTION, SOLUTION SUBCUTANEOUS WEEKLY
COMMUNITY
Start: 2024-08-16 | End: 2024-09-13

## 2024-08-23 NOTE — TELEPHONE ENCOUNTER
Pt called office asking if dr Billings can prescribe her anxiety medication for procedure for medical , pt states that she started the procedure today and will finish it tomorrow but she sometimes gets anxious.

## 2024-08-26 NOTE — TELEPHONE ENCOUNTER
Patient notified or provider indications and voiced understanding. She prefers to cancel appointment scheduled for tomorrow. Aware  will not prescribe Xanax alone for treatment of anxiety.     Per pat she has a follow up appointment with OB in 4 weeks.

## 2024-08-26 NOTE — TELEPHONE ENCOUNTER
I won't be able to prescribe her xanax until her hcg reaches 0.  She may want to move her appt out further so we can discuss anxiety.  She is also aware that we do not prescribe this alone to treat anxiety.  She has been made aware of this at previous appts.  If she is struggling with anxiety about this procedure, her ob/gyne should get her established with mental health provider/counselor

## 2024-08-26 NOTE — TELEPHONE ENCOUNTER
Patient was last seen for anxiety 6/11/2024    Anxiety stable. Ok to stay off sertraline. Is taking alprazolam prn-has only taken 3 tablets out of 30. Will not leave pt on this long term.    Patient was last prescribed alprazolam 5/14/2024-7/5/2024    Medication Quantity Refills Start End   ALPRAZolam (XANAX) 0.25 MG Oral Tab (Discontinued) 30 tablet 0 5/14/2024 7/5/2024   Sig:   Take 1 tablet (0.25 mg total) by mouth nightly as needed.       Sent a message for patient to call back office and schedule a follow up appointment with PCP.    Routed to provider for review

## 2024-08-26 NOTE — TELEPHONE ENCOUNTER
Patient would like to discuss anxiety with provider and refill request for Xanax PRN. States she has dicussed with  in the past and has had a prescription in the past as well,     Pt states she started an oral treatment for  over the weekend and has been feeling increased anxiety and trouble sleeping.     Pt has in office scheduled appointment for tomorrow 2024

## 2024-08-26 NOTE — TELEPHONE ENCOUNTER
From: Jacquelyn Jerry  To: Mariana Billings  Sent: 2024 2:34 PM CDT  Subject: Refill Xanax    Christopher Billings I recently just had a pill  procedure and I was wondering can you refill my Xanax because after this procedure I deal with anxiety and panic attacks and this medication help me temporarily to get thru along with exercise and staying hydrated. so if you can just refill me for 30 days that would be great and I can schedule a follow with you to. Thanks have a great Day.

## 2024-09-13 ENCOUNTER — HOSPITAL ENCOUNTER (OUTPATIENT)
Age: 34
Discharge: HOME | End: 2024-09-13
Payer: COMMERCIAL

## 2024-09-13 VITALS
TEMPERATURE: 99 F | RESPIRATION RATE: 18 BRPM | DIASTOLIC BLOOD PRESSURE: 80 MMHG | SYSTOLIC BLOOD PRESSURE: 144 MMHG | OXYGEN SATURATION: 100 % | HEART RATE: 118 BPM

## 2024-09-13 DIAGNOSIS — J40 BRONCHITIS: ICD-10-CM

## 2024-09-13 DIAGNOSIS — R05.1 ACUTE COUGH: Primary | ICD-10-CM

## 2024-09-13 LAB — SARS-COV-2 RNA RESP QL NAA+PROBE: NOT DETECTED

## 2024-09-13 PROCEDURE — U0002 COVID-19 LAB TEST NON-CDC: HCPCS | Performed by: NURSE PRACTITIONER

## 2024-09-13 PROCEDURE — 99213 OFFICE O/P EST LOW 20 MIN: CPT | Performed by: NURSE PRACTITIONER

## 2024-09-13 RX ORDER — BENZONATATE 100 MG/1
100 CAPSULE ORAL 3 TIMES DAILY PRN
Qty: 30 CAPSULE | Refills: 0 | Status: SHIPPED | OUTPATIENT
Start: 2024-09-13 | End: 2024-10-13

## 2024-09-13 RX ORDER — PROMETHAZINE HYDROCHLORIDE AND CODEINE PHOSPHATE 6.25; 1 MG/5ML; MG/5ML
5 SYRUP ORAL EVERY 4 HOURS PRN
Qty: 120 ML | Refills: 0 | Status: SHIPPED | OUTPATIENT
Start: 2024-09-13 | End: 2024-09-18

## 2024-09-13 RX ORDER — METHYLPREDNISOLONE 4 MG
TABLET, DOSE PACK ORAL
Qty: 21 TABLET | Refills: 0 | Status: SHIPPED | OUTPATIENT
Start: 2024-09-13

## 2024-09-13 RX ORDER — ALBUTEROL SULFATE 90 UG/1
2 AEROSOL, METERED RESPIRATORY (INHALATION) EVERY 4 HOURS PRN
Qty: 25.5 G | Refills: 0 | Status: SHIPPED | OUTPATIENT
Start: 2024-09-13

## 2024-09-13 NOTE — DISCHARGE INSTRUCTIONS
Drink plenty fluids  During the day, you may use Mucinex D one tab every 12 hours    Also consider using inhaler every 4 hours   Start taking a antihistamine daily, Zyrtec, Xyzal, Allegra or Claritin  Take steroids as prescribed.   Follow-up with your family doctor in 2-3 days if no better  Return to ED for any worsening or persisting symptoms, shortness of breath, chest pain, dizziness, fever.

## 2024-09-13 NOTE — ED PROVIDER NOTES
Patient Seen in: [facility]      History     Chief Complaint   Patient presents with    Cough     Been coughing and hurts my chest sometimes keeping me up and I took over counter cough meds not working - Entered by patient     Stated Complaint: Cough - Been coughing and hurts my chest sometimes keeping me up and I took ove*    Subjective:   Immediate care for cough and congestion.  Patient states symptoms started 4 days ago she has been having difficulty sleeping since.  Has been using over-the-counter medications with little relief.            Objective:   Past Medical History:    Anxiety    Elevated testosterone level in female    referred to endo    Essential hypertension    Obesity              History reviewed. No pertinent surgical history.             Social History     Socioeconomic History    Marital status: Single   Tobacco Use    Smoking status: Never    Smokeless tobacco: Never   Vaping Use    Vaping status: Never Used   Substance and Sexual Activity    Alcohol use: Not Currently     Alcohol/week: 1.0 standard drink of alcohol     Types: 1 Glasses of wine per week     Comment: Once a month    Drug use: Never   Other Topics Concern    Stress Concern Yes    Weight Concern Yes              Review of Systems   Constitutional: Negative.    Respiratory: Negative.     Cardiovascular: Negative.    Gastrointestinal: Negative.    Skin: Negative.    Neurological: Negative.        Positive for stated Chief Complaint: Cough (Been coughing and hurts my chest sometimes keeping me up and I took over counter cough meds not working - Entered by patient)    Other systems are as noted in HPI.  Constitutional and vital signs reviewed.      All other systems reviewed and negative except as noted above.    Physical Exam     ED Triage Vitals [09/13/24 0911]   /80   Pulse 118   Resp 18   Temp 98.7 °F (37.1 °C)   Temp src Oral   SpO2 100 %   O2 Device None (Room air)       Current Vitals:   Vital  Signs  BP: 144/80 (Pt took Mucinex)  Pulse: 118  Resp: 18  Temp: 98.7 °F (37.1 °C)  Temp src: Oral    Oxygen Therapy  SpO2: 100 %  O2 Device: None (Room air)            Physical Exam  Vitals and nursing note reviewed.   Constitutional:       General: She is not in acute distress.  HENT:      Head: Normocephalic.      Nose: Congestion present.   Cardiovascular:      Rate and Rhythm: Normal rate.   Pulmonary:      Effort: Pulmonary effort is normal.      Breath sounds: Normal breath sounds. No wheezing.   Musculoskeletal:         General: Normal range of motion.   Skin:     General: Skin is warm and dry.   Neurological:      General: No focal deficit present.      Mental Status: She is alert and oriented to person, place, and time.               ED Course     Labs Reviewed   RAPID SARS-COV-2 BY PCR - Normal                      MDM      Medical Decision Making  Pertinent Labs & Imaging studies reviewed. (See chart for details).  Patient coming in with cough congestion.   Differential diagnosis includes viral URI, bronchitis  Will discharge on COVID test was negative, will discharge on inhaler, steroids cough syrup.   Patient is comfortable with this plan.     Overall Pt looks good. Non-toxic, well-hydrated and in no respiratory distress. Vital signs are reassuring. Exam is reassuring. I do not believe pt requires and additional diagnostic studies or intervention. I believe pt can be discharged home to continue evaluation as an outpatient. Follow-up provider given. Discharge instructions given and reviewed. Return for any problems. All understand and agrees with the plan.        Problems Addressed:  Acute cough: acute illness or injury  Bronchitis: acute illness or injury    Amount and/or Complexity of Data Reviewed  Labs: ordered. Decision-making details documented in ED Course.    Risk  OTC drugs.  Prescription drug management.        Disposition and Plan     Clinical Impression:  1. Acute cough    2. Bronchitis          Disposition:  Discharge  9/13/2024 10:34 am    Follow-up:  Mariana Billings DO  [address] [address]  [phone]                Medications Prescribed:  Discharge Medication List as of 9/13/2024 10:35 AM        START taking these medications    Details   methylPREDNISolone (MEDROL) 4 MG Oral Tablet Therapy Pack Dosepack: take as directed, Normal, Disp-21 tablet, R-0      albuterol 108 (90 Base) MCG/ACT Inhalation Aero Soln Inhale 2 puffs into the lungs every 4 (four) hours as needed for Wheezing., Normal, Disp-25.5 g, R-0      benzonatate 100 MG Oral Cap Take 1 capsule (100 mg total) by mouth 3 (three) times daily as needed for cough., Normal, Disp-30 capsule, R-0      promethazine-codeine 6.25-10 MG/5ML Oral Syrup Take 5 mL by mouth every 4 (four) hours as needed for cough., Normal, Disp-120 mL, R-0

## 2024-09-18 ENCOUNTER — OFFICE VISIT (OUTPATIENT)
Dept: FAMILY MEDICINE CLINIC | Facility: CLINIC | Age: 34
End: 2024-09-18
Payer: COMMERCIAL

## 2024-09-18 VITALS
OXYGEN SATURATION: 98 % | WEIGHT: 199.19 LBS | RESPIRATION RATE: 18 BRPM | SYSTOLIC BLOOD PRESSURE: 142 MMHG | DIASTOLIC BLOOD PRESSURE: 88 MMHG | TEMPERATURE: 99 F | BODY MASS INDEX: 36 KG/M2 | HEART RATE: 103 BPM

## 2024-09-18 DIAGNOSIS — E66.9 OBESITY, UNSPECIFIED CLASSIFICATION, UNSPECIFIED OBESITY TYPE, UNSPECIFIED WHETHER SERIOUS COMORBIDITY PRESENT: ICD-10-CM

## 2024-09-18 DIAGNOSIS — R05.9 COUGH, UNSPECIFIED TYPE: ICD-10-CM

## 2024-09-18 DIAGNOSIS — J40 BRONCHITIS: Primary | ICD-10-CM

## 2024-09-18 DIAGNOSIS — Z87.59 HISTORY OF ABORTION: ICD-10-CM

## 2024-09-18 DIAGNOSIS — Z34.90 PREGNANCY, UNSPECIFIED GESTATIONAL AGE (HCC): ICD-10-CM

## 2024-09-18 DIAGNOSIS — F41.9 ANXIETY: ICD-10-CM

## 2024-09-18 DIAGNOSIS — Z71.3 WEIGHT LOSS COUNSELING, ENCOUNTER FOR: ICD-10-CM

## 2024-09-18 LAB — B-HCG SERPL-ACNC: 10.4 MIU/ML

## 2024-09-18 PROCEDURE — 84702 CHORIONIC GONADOTROPIN TEST: CPT | Performed by: FAMILY MEDICINE

## 2024-09-18 PROCEDURE — 99215 OFFICE O/P EST HI 40 MIN: CPT | Performed by: FAMILY MEDICINE

## 2024-09-18 RX ORDER — PROMETHAZINE HYDROCHLORIDE AND CODEINE PHOSPHATE 6.25; 1 MG/5ML; MG/5ML
5 SYRUP ORAL EVERY 4 HOURS PRN
Qty: 120 ML | Refills: 0 | Status: CANCELLED | OUTPATIENT
Start: 2024-09-18 | End: 2024-09-23

## 2024-09-18 RX ORDER — PROMETHAZINE HYDROCHLORIDE AND CODEINE PHOSPHATE 6.25; 1 MG/5ML; MG/5ML
5 SOLUTION ORAL NIGHTLY PRN
Qty: 118 ML | Refills: 0 | Status: SHIPPED | OUTPATIENT
Start: 2024-09-18

## 2024-09-18 RX ORDER — BENZONATATE 100 MG/1
100 CAPSULE ORAL 3 TIMES DAILY PRN
Qty: 30 CAPSULE | Refills: 0 | Status: SHIPPED | OUTPATIENT
Start: 2024-09-18 | End: 2024-10-18

## 2024-09-18 RX ORDER — ALPRAZOLAM 0.25 MG
0.25 TABLET ORAL
Qty: 30 TABLET | Refills: 0 | Status: CANCELLED | OUTPATIENT
Start: 2024-09-18

## 2024-09-18 NOTE — PROGRESS NOTES
HPI:     Jacquelyn Jerry is a 33 year old female presents for    Multiple issues.  Had an  last month.  Wants to get back on xanax to take as needed for panic attacks.  Is going through a lot with the  and her teen daughter who has run away from home again.  Says she wants to take something as needed for anxiety as it's not every day and only on occasion.  Has occasional panic attack maybe once or twice a month.  We do not have an hcg on file saying it is at 0.  Also wants to restart her wegovy since not being pregnant anymore.  Wants to start at previous dose she was on.  Also went to  where she works for a cough 5 days ago.  Had negative covid testing.  Given medrol dose pack-today is last today medrol.  Told to take albuterol twice a day for 14 days.  Specifically wants promethazine with codeine refilled.  Tells me it's the only thing that works for her and she's always taken that.  Wants refill of benzonatate also.  Is coughing at night.  Does not cough once during the visit.        Medications (Active prior to today's visit):  Current Outpatient Medications   Medication Sig Dispense Refill    ofloxacin 0.3 % Ophthalmic Solution Place 2 drops into both eyes every 4 (four) hours.      albuterol 108 (90 Base) MCG/ACT Inhalation Aero Soln Inhale 2 puffs into the lungs every 4 (four) hours as needed for Wheezing. 25.5 g 0    benzonatate 100 MG Oral Cap Take 1 capsule (100 mg total) by mouth 3 (three) times daily as needed for cough. 30 capsule 0    promethazine-codeine 6.25-10 MG/5ML Oral Syrup Take 5 mL by mouth every 4 (four) hours as needed for cough. 120 mL 0    ALPRAZolam (XANAX) 0.25 MG Oral Tab Take 1 tablet (0.25 mg total) by mouth.         Allergies:  No Known Allergies    PSFH elements reviewed from today and agreed except as otherwise stated in HPI.  ROS:      Pertinent positives and negatives noted in the the HPI.    PHYSICAL EXAM:     Vitals:    24 0958 24 1044    BP: (!) 136/98 142/88   BP Location: Left arm Left arm   Patient Position: Sitting Sitting   Cuff Size: large large   Pulse: 103    Resp: 18    Temp: 99.1 °F (37.3 °C)    TempSrc: Temporal    SpO2: 98%    Weight: 199 lb 3.2 oz (90.4 kg)      Vital signs reviewed.Appears stated age, well groomed.  Physical Exam  Constitutional:       Appearance: Normal appearance. She is well-developed and well-groomed.   HENT:      Right Ear: Tympanic membrane and ear canal normal.      Left Ear: Tympanic membrane and ear canal normal.      Mouth/Throat:      Mouth: Mucous membranes are moist.      Pharynx: Oropharynx is clear.   Cardiovascular:      Rate and Rhythm: Normal rate and regular rhythm.      Pulses: Normal pulses.      Heart sounds: No murmur heard.     No friction rub. No gallop.   Pulmonary:      Effort: Pulmonary effort is normal. No respiratory distress.      Breath sounds: No decreased breath sounds, wheezing, rhonchi or rales.   Abdominal:      General: Bowel sounds are normal. There is no distension.      Palpations: Abdomen is soft.      Tenderness: There is no abdominal tenderness.   Musculoskeletal:         General: No tenderness.      Cervical back: Neck supple.      Right lower leg: No edema.      Left lower leg: No edema.   Lymphadenopathy:      Head:      Right side of head: No submental, submandibular, preauricular or posterior auricular adenopathy.      Left side of head: No submental, submandibular, preauricular or posterior auricular adenopathy.      Cervical: No cervical adenopathy.      Upper Body:      Right upper body: No supraclavicular adenopathy.      Left upper body: No supraclavicular adenopathy.   Skin:     General: Skin is warm.   Neurological:      General: No focal deficit present.      Mental Status: She is alert.   Psychiatric:         Mood and Affect: Mood normal.         Speech: Speech normal.         Behavior: Behavior normal. Behavior is cooperative.          ASSESSMENT/PLAN:   33  year old female with    1. Bronchitis    2. Anxiety    3. Pregnancy, unspecified gestational age (HCC)    4. Cough, unspecified type    5. Obesity, unspecified classification, unspecified obesity type, unspecified whether serious comorbidity present    6. Weight loss counseling, encounter for    7. History of         I have spent 40 minutes in the care of this patient including review of their past medical records and diagnostic tests, updating their chart, seeing the patient, discussing current treatment plans and documenting the encounter.       Pt's lung exam is unremarkable.  She does not cough once during visit.  I did try to send in cheratussin ac for her cough at night but she refused and said promethazine with coedine is only thing that works for her cough.  I agreed to send it in this one time with the knowledge I will not refill this nor prescribe it for her ever again as I do not prescribe this medication in my practice.  Refilled benzonatate.    Obesity w/ exacerbation.  Will first need to see that hcg is 0.  Once it is, can restart wegovy at lowest dose 0.25 mg subcutaneous weekly.  Side effects reviewed.  Anxiety w/ exacerbation.  Pt does not think she feels anxious majority of time and only has rare panic attacks.  I have previously tried her on zoloft and she had side effects.  I explained that this will be last refill of xanax from our practice and if she is looking to only be on this medication that I am not the right provider for her.  Once her hcg is 0 and she is OFF of promethazine with codeine, will send in 1 more dose of alprazolam.  This will be the absolute last rx of this medication from our office.  I would treat her anxiety with SSRI.  Her other option would be  navigator   F/u 1 mo for med check on wegovy or sooner if needed     Patient/Caregiver Education: There are no barriers to learning. Medical education done.   Outcome: Patient verbalizes understanding and agrees with  plan. Advised to call or RTC if symptoms persist or worsen.    9/18/2024  Mariana Billings, DO    Patient understands plan and follow-up.

## 2024-09-18 NOTE — PATIENT INSTRUCTIONS
Blood work     Wegovy 0.25 mg weekly     Refilled cough medication     F/u 1 mo for med check    Patent

## 2024-09-18 NOTE — PROGRESS NOTES
Patient came in for draw of ordered non fasting labs. Patient drawn out of right , x 1 attempt and tolerated well.  SST (mint green) tube drawn.

## 2024-09-20 ENCOUNTER — OFFICE VISIT (OUTPATIENT)
Dept: OBGYN CLINIC | Facility: CLINIC | Age: 34
End: 2024-09-20
Payer: COMMERCIAL

## 2024-09-20 VITALS
BODY MASS INDEX: 36.06 KG/M2 | SYSTOLIC BLOOD PRESSURE: 112 MMHG | DIASTOLIC BLOOD PRESSURE: 80 MMHG | WEIGHT: 201 LBS | HEIGHT: 62.5 IN

## 2024-09-20 DIAGNOSIS — Z30.09 ENCOUNTER FOR COUNSELING REGARDING INITIATION OF OTHER CONTRACEPTIVE MEASURE: Primary | ICD-10-CM

## 2024-09-20 PROCEDURE — 99203 OFFICE O/P NEW LOW 30 MIN: CPT | Performed by: STUDENT IN AN ORGANIZED HEALTH CARE EDUCATION/TRAINING PROGRAM

## 2024-09-20 NOTE — PROGRESS NOTES
GYN PROBLEM VISIT    Subjective:   This is a 33 year old  presenting for GYN visit.     Would like to discuss contraception. Recent induced medical .       Review of Systems   Constitutional: Negative.    HENT: Negative.    Respiratory: Negative.    Gastrointestinal: Negative.    Endocrine: Negative.    Genitourinary: Negative.    Musculoskeletal: Negative.    Skin: Negative.    Allergic/Immunologic: Negative.    Neurological: Negative.      Past Medical History:    Anxiety    Bronchitis    Elevated testosterone level in female    referred to endo    Essential hypertension    Obesity       History reviewed. No pertinent surgical history.    No Known Allergies     benzonatate 100 MG Oral Cap Take 1 capsule (100 mg total) by mouth 3 (three) times daily as needed for cough. 30 capsule 0    Promethazine-Codeine 6.25-10 MG/5ML Oral Solution Take 5 mL by mouth nightly as needed (cough). 118 mL 0    ofloxacin 0.3 % Ophthalmic Solution Place 2 drops into both eyes every 4 (four) hours.      albuterol 108 (90 Base) MCG/ACT Inhalation Aero Soln Inhale 2 puffs into the lungs every 4 (four) hours as needed for Wheezing. 25.5 g 0    ALPRAZolam (XANAX) 0.25 MG Oral Tab Take 1 tablet (0.25 mg total) by mouth.           Objective:    Physical Exam     Vitals:    24 0807   BP: 112/80        Constitutional: She is oriented to person, place, and time. She appears well-developed and well-nourished.     Assessment/Plan:  This is a 33 year old  presenting to discuss contraception. We discussed all contraceptive options. She elects for a Mirena IUD. We discussed risks of bleeding, infection, uterine perforation, long/lost strings. She had an iud in the past.    Yang Lee MD

## 2024-09-26 ENCOUNTER — NURSE ONLY (OUTPATIENT)
Dept: FAMILY MEDICINE CLINIC | Facility: CLINIC | Age: 34
End: 2024-09-26
Payer: COMMERCIAL

## 2024-09-26 DIAGNOSIS — Z34.90 PREGNANCY, UNSPECIFIED GESTATIONAL AGE (HCC): ICD-10-CM

## 2024-09-26 LAB — B-HCG SERPL-ACNC: <2.6 MIU/ML

## 2024-09-26 PROCEDURE — 84702 CHORIONIC GONADOTROPIN TEST: CPT | Performed by: FAMILY MEDICINE

## 2024-09-26 NOTE — PROGRESS NOTES
Patient came in for draw of ordered fasting labs. Patient drawn out of left hand  AC, x 2 attempt and tolerated well.  1 Sst ( green)  tube drawn.

## 2024-09-27 DIAGNOSIS — F41.9 ANXIETY: ICD-10-CM

## 2024-09-27 RX ORDER — ALPRAZOLAM 0.25 MG/1
0.25 TABLET ORAL NIGHTLY PRN
Qty: 30 TABLET | Refills: 0 | OUTPATIENT
Start: 2024-09-27

## 2024-09-27 NOTE — TELEPHONE ENCOUNTER
Labs from 09/26/2024  Component  Ref Range & Units 9/26/24  8:54 AM   Hcg Quantitative  <=4.2 mIU/mL <2.6     Pt requesting refills for Xanax and Wegovy     Wegovy 1.7 mg last dose was 08/01/2024 if okay with refill please advice dose to restart

## 2024-09-27 NOTE — TELEPHONE ENCOUNTER
Medication and dose:  Wegovy and Xanax    30 or 90 day supply:  90    Pharmacy: [facility] in [place]    Additional notes:  pt already did needed test for refill    Your medication request will be sent to our clinical team.  If they have any questions they will call you. Patient expressed understanding.

## 2024-10-04 ENCOUNTER — OFFICE VISIT (OUTPATIENT)
Dept: OTHER | Facility: HOSPITAL | Age: 34
End: 2024-10-04
Attending: FAMILY MEDICINE

## 2024-10-04 RX ORDER — AZITHROMYCIN 250 MG/1
TABLET, FILM COATED ORAL
Qty: 6 TABLET | Refills: 0 | Status: SHIPPED | OUTPATIENT
Start: 2024-10-04

## 2024-10-18 ENCOUNTER — TELEPHONE (OUTPATIENT)
Dept: FAMILY MEDICINE CLINIC | Facility: CLINIC | Age: 34
End: 2024-10-18

## 2024-10-18 ENCOUNTER — OFFICE VISIT (OUTPATIENT)
Dept: FAMILY MEDICINE CLINIC | Facility: CLINIC | Age: 34
End: 2024-10-18
Payer: COMMERCIAL

## 2024-10-18 VITALS
TEMPERATURE: 98 F | DIASTOLIC BLOOD PRESSURE: 74 MMHG | WEIGHT: 197.81 LBS | SYSTOLIC BLOOD PRESSURE: 126 MMHG | OXYGEN SATURATION: 98 % | HEART RATE: 111 BPM | RESPIRATION RATE: 22 BRPM | BODY MASS INDEX: 35.49 KG/M2 | HEIGHT: 62.5 IN

## 2024-10-18 DIAGNOSIS — F41.9 ANXIETY: ICD-10-CM

## 2024-10-18 DIAGNOSIS — E66.9 OBESITY, UNSPECIFIED CLASS, UNSPECIFIED OBESITY TYPE, UNSPECIFIED WHETHER SERIOUS COMORBIDITY PRESENT: Primary | ICD-10-CM

## 2024-10-18 DIAGNOSIS — Z71.3 WEIGHT LOSS COUNSELING, ENCOUNTER FOR: ICD-10-CM

## 2024-10-18 PROCEDURE — 99215 OFFICE O/P EST HI 40 MIN: CPT | Performed by: FAMILY MEDICINE

## 2024-10-18 RX ORDER — IBUPROFEN 600 MG/1
600 TABLET, FILM COATED ORAL EVERY 6 HOURS PRN
Qty: 12 TABLET | Refills: 0 | Status: SHIPPED | OUTPATIENT
Start: 2024-10-18

## 2024-10-18 NOTE — TELEPHONE ENCOUNTER
Mariana Billings,  wanted to verify with pharmacy that they did not advice patient she can jump medication dosing from 0.25 to 1 mg for wegovy. pharmacy told me that they cannot advice patient on medication dosing, that they only tell patients the available dose they have in pharmacy.     Per Mariana Billings, DO will stick with the 0.5 mg subq dose after she completes the 0.25 mg doses x 4 weeks.     Called patient to inform, patient verbalized understanding

## 2024-10-18 NOTE — PROGRESS NOTES
HPI:     Jacquelyn Jerry is a 33 year old female presents for    Med check.  When I enter the room she tells me she wants to apologize for the last appt.  Says she talked with her mother and thinks she \"threw too much stuff at you\".  Tells me she wants to have a good relationship with me and feel comfortable with her physician and their medical decisions.  Tells me she was upset when I told her at her last visit that she is stressed often.  States her daughter has run away twice since I've been seeing her but is back home now so is doing ok with stress level.  She knows how I feel about alprazolam and not prescribing it long term, but states she only takes it as needed and her mother told her to cut them in half to make them last longer.  Was restarted on Wegovy 0.25 mg subcutaneous dose 3 weeks ago.  Still needs to take 1 more dose.  Tells me she was talking with her pharmacist at the [facility] in [place] on [address] and both pharmacist and her said that it is up to me as it is my medical license but that since patient has already been on Wegovy 1.7 mg subcutaneous weekly in the past it would be okay currently to go from 0.25 mg subcutaneous weekly to the 1 mg subcutaneous weekly dose without first increasing to 0.5 mg subcutaneous weekly dose.  States it is up to me but it something they discussed.  Also tells me that she has a tension headache today and specifically asked that I prescribe her ibuprofen prescription strength for this.          Medications (Active prior to today's visit):  Current Outpatient Medications   Medication Sig Dispense Refill    ALPRAZolam (XANAX) 0.25 MG Oral Tab Take 1 tablet (0.25 mg total) by mouth nightly as needed. 30 tablet 0    semaglutide-weight management 0.25 MG/0.5ML Subcutaneous Solution Auto-injector Inject 0.5 mL (0.25 mg total) into the skin once a week for 4 doses. 2 mL 0    albuterol 108 (90 Base) MCG/ACT Inhalation Aero Soln Inhale 2 puffs into the lungs every 4  (four) hours as needed for Wheezing. 25.5 g 0       Allergies:  Allergies[1]    PSFH elements reviewed from today and agreed except as otherwise stated in HPI.  ROS:      Pertinent positives and negatives noted in the the HPI.    PHYSICAL EXAM:     Vitals:    10/18/24 1428   BP: 126/74   BP Location: Left arm   Patient Position: Sitting   Cuff Size: large   Pulse: 111   Resp: 22   Temp: 98.2 °F (36.8 °C)   TempSrc: Temporal   SpO2: 98%   Weight: 197 lb 12.8 oz (89.7 kg)   Height: 5' 2.5\" (1.588 m)     Vital signs reviewed.Appears stated age, well groomed.  Physical Exam  Constitutional:       Appearance: Normal appearance.   Cardiovascular:      Rate and Rhythm: Normal rate and regular rhythm.      Pulses: Normal pulses.      Heart sounds: No murmur heard.     No friction rub. No gallop.   Pulmonary:      Effort: Pulmonary effort is normal. No respiratory distress.      Breath sounds: No wheezing, rhonchi or rales.   Abdominal:      General: Bowel sounds are normal. There is no distension.      Palpations: Abdomen is soft.      Tenderness: There is no abdominal tenderness.      Comments: obese   Musculoskeletal:         General: No tenderness.      Cervical back: Neck supple.      Right lower leg: No edema.      Left lower leg: No edema.   Skin:     General: Skin is warm.   Neurological:      General: No focal deficit present.      Mental Status: She is alert.   Psychiatric:         Mood and Affect: Mood is anxious.         Speech: Speech normal.         Behavior: Behavior normal.          ASSESSMENT/PLAN:   33 year old female with    1. Obesity, unspecified class, unspecified obesity type, unspecified whether serious comorbidity present    2. Anxiety    3. Weight loss counseling, encounter for        I have spent 40 minutes in the care of this patient including review of their past medical records and diagnostic tests, updating their chart, seeing the patient, discussing current treatment plans and documenting the  encounter.      The patient and provider have a longitudinal relationship to address/treat the serious or complex condition as stated in this encounter.        1.  Obesity improving but still with exacerbation.  We again discussed that it is unsafe to go from a 0.25 mg subcutaneous weekly dose to a 1 mg subcutaneous weekly dose.  We need to be safe about how we prescribe this medication for her and we will be prescribing 0.5 mg subcutaneous weekly once she takes the fourth dose of the 0.25 mg subcutaneous weekly.  Will call over to pharmacy to see if they made that recommendation on the dosing as patient noted.    2.  Continue to make healthy diet changes  3.  Again explained to patient in detail that I may not be the right fit for her as again she wants me to prescribe medications that I do not prescribe such as chronic Xanax use.  I explained that she  3.  Follow-up in 1 month for med check, weight check or sooner if needed      Patient/Caregiver Education: There are no barriers to learning. Medical education done.   Outcome: Patient verbalizes understanding and agrees with plan. Advised to call or RTC if symptoms persist or worsen.    10/18/2024  Mariana Billings, DO    Patient understands plan and follow-up.  No follow-ups on file.            [1] No Known Allergies

## 2024-11-18 ENCOUNTER — OFFICE VISIT (OUTPATIENT)
Dept: FAMILY MEDICINE CLINIC | Facility: CLINIC | Age: 34
End: 2024-11-18
Payer: COMMERCIAL

## 2024-11-18 VITALS
HEIGHT: 62.5 IN | WEIGHT: 194.31 LBS | BODY MASS INDEX: 34.86 KG/M2 | TEMPERATURE: 98 F | HEART RATE: 105 BPM | OXYGEN SATURATION: 98 % | RESPIRATION RATE: 20 BRPM

## 2024-11-18 DIAGNOSIS — Z71.3 WEIGHT LOSS COUNSELING, ENCOUNTER FOR: ICD-10-CM

## 2024-11-18 DIAGNOSIS — E66.9 OBESITY, UNSPECIFIED CLASS, UNSPECIFIED OBESITY TYPE, UNSPECIFIED WHETHER SERIOUS COMORBIDITY PRESENT: Primary | ICD-10-CM

## 2024-11-18 PROCEDURE — 99214 OFFICE O/P EST MOD 30 MIN: CPT | Performed by: FAMILY MEDICINE

## 2024-11-18 RX ORDER — HYDROCORTISONE 25 MG/G
1 CREAM TOPICAL 2 TIMES DAILY
Qty: 45 G | Refills: 0 | Status: SHIPPED | OUTPATIENT
Start: 2024-11-18

## 2024-11-18 RX ORDER — SEMAGLUTIDE 1 MG/.5ML
1 INJECTION, SOLUTION SUBCUTANEOUS WEEKLY
Qty: 4 EACH | Refills: 0 | Status: SHIPPED | OUTPATIENT
Start: 2024-11-18

## 2024-11-18 NOTE — PATIENT INSTRUCTIONS
Increase wegovy 1 mg subcutaneous weekly for 4 weeks     Hydrocortisone cream to back up to 2 weeks and then stop, gentle soap, unscented     F/u 1 mo for med check

## 2024-11-18 NOTE — PROGRESS NOTES
HPI:     Jacquelyn Jerry is a 33 year old female presents for    Med check.  Has been on wegovy 0.5 mg subcutaneous weekly for 3 weeks, has 1 more week left.  Has lost another 3 lbs over past several weeks.  Trying to eat better, but still enjoys carbs.  No side effects w/ medication.  At one point previously was on 1.7 mg subcutaneous dose.  Denies chest pain or palpitations.  No n/v.  No diarrhea.  Occasional constipation-hard pellet like stools.      Also has some itching on her lower back after using a sheet spray from [employer].  Believes this has done it.  No other new products.  Has not treated with anything.        Medications (Active prior to today's visit):  Current Outpatient Medications   Medication Sig Dispense Refill    semaglutide-weight management (WEGOVY) 1 MG/0.5ML Subcutaneous Solution Auto-injector Inject 0.5 mL (1 mg total) into the skin once a week. 4 each 0    hydrocortisone 2.5 % External Cream Apply 1 Application topically 2 (two) times daily. 45 g 0    ibuprofen 600 MG Oral Tab Take 1 tablet (600 mg total) by mouth every 6 (six) hours as needed for Pain. 12 tablet 0    ALPRAZolam (XANAX) 0.25 MG Oral Tab Take 1 tablet (0.25 mg total) by mouth nightly as needed. 30 tablet 0    albuterol 108 (90 Base) MCG/ACT Inhalation Aero Soln Inhale 2 puffs into the lungs every 4 (four) hours as needed for Wheezing. 25.5 g 0       Allergies:  Allergies[1]    PSFH elements reviewed from today and agreed except as otherwise stated in HPI.  ROS:      Pertinent positives and negatives noted in the the HPI.    PHYSICAL EXAM:     Vitals:    11/18/24 0835   Pulse: 105   Resp: 20   Temp: 97.5 °F (36.4 °C)   TempSrc: Temporal   SpO2: 98%   Weight: 194 lb 4.8 oz (88.1 kg)   Height: 5' 2.5\" (1.588 m)     Vital signs reviewed.Appears stated age, well groomed.  Physical Exam  Constitutional:       General: She is awake.      Appearance: Normal appearance. She is well-developed.   Cardiovascular:      Rate and  Rhythm: Normal rate and regular rhythm.      Pulses: Normal pulses.      Heart sounds: No murmur heard.     No friction rub. No gallop.   Pulmonary:      Effort: Pulmonary effort is normal. No respiratory distress.      Breath sounds: No wheezing, rhonchi or rales.   Abdominal:      General: Bowel sounds are normal. There is no distension.      Palpations: Abdomen is soft.      Tenderness: There is no abdominal tenderness.      Comments: obese   Musculoskeletal:         General: No tenderness.      Cervical back: Neck supple.      Right lower leg: No edema.      Left lower leg: No edema.   Skin:     General: Skin is warm.      Findings: Rash present. Rash is macular (macpap rash on lower back that is pruritic).   Neurological:      General: No focal deficit present.      Mental Status: She is alert.   Psychiatric:         Mood and Affect: Mood normal.         Speech: Speech normal.         Behavior: Behavior normal. Behavior is cooperative.        ASSESSMENT/PLAN:   33 year old female with    1. Obesity, unspecified class, unspecified obesity type, unspecified whether serious comorbidity present    2. Weight loss counseling, encounter for        The patient and provider have a longitudinal relationship to address/treat the serious or complex condition as stated in this encounter.      1.  Obesity improving but still with exacerbation.  Increase wegovy to 1 mg subcutaneous weekly after finishing the 4th dose of the 0.5 mg subcutaneous weekly dose.    2.  Continue to make healthy diet changes, monitor carb intake   3.  Recommend 2.5% hydrocortisone cream bid for up to 2 weeks for rash on back.  Not to use more than 2 weeks due to risk of hypopigmentation.  If no better after this, f/u with derm   4.  Follow-up in 1 month for med check, weight check or sooner if needed    Patient/Caregiver Education: There are no barriers to learning. Medical education done.   Outcome: Patient verbalizes understanding and agrees with  plan. Advised to call or RTC if symptoms persist or worsen.    Mariana Billings DO, 11/18/24, 1:03 PM    Patient understands plan and follow-up.  Return in about 1 month (around 12/18/2024) for medication/ weight check.          [1] No Known Allergies

## 2024-11-22 ENCOUNTER — IMMUNIZATION (OUTPATIENT)
Dept: FAMILY MEDICINE CLINIC | Facility: CLINIC | Age: 34
End: 2024-11-22
Payer: COMMERCIAL

## 2024-11-22 DIAGNOSIS — Z23 NEED FOR INFLUENZA VACCINATION: Primary | ICD-10-CM

## 2024-12-18 ENCOUNTER — OFFICE VISIT (OUTPATIENT)
Dept: FAMILY MEDICINE CLINIC | Facility: CLINIC | Age: 34
End: 2024-12-18
Payer: COMMERCIAL

## 2024-12-18 VITALS
OXYGEN SATURATION: 97 % | HEART RATE: 105 BPM | WEIGHT: 195.19 LBS | BODY MASS INDEX: 34.16 KG/M2 | TEMPERATURE: 98 F | SYSTOLIC BLOOD PRESSURE: 118 MMHG | HEIGHT: 63.2 IN | RESPIRATION RATE: 18 BRPM | DIASTOLIC BLOOD PRESSURE: 88 MMHG

## 2024-12-18 DIAGNOSIS — E66.9 OBESITY, UNSPECIFIED CLASS, UNSPECIFIED OBESITY TYPE, UNSPECIFIED WHETHER SERIOUS COMORBIDITY PRESENT: Primary | ICD-10-CM

## 2024-12-18 DIAGNOSIS — Z71.3 WEIGHT LOSS COUNSELING, ENCOUNTER FOR: ICD-10-CM

## 2024-12-18 PROCEDURE — 99214 OFFICE O/P EST MOD 30 MIN: CPT | Performed by: FAMILY MEDICINE

## 2024-12-18 NOTE — PATIENT INSTRUCTIONS
Weight loss clinic vs [facility]       Increase wegovy to 1.7 mg subcutaneous daily     F/u 1 mo for med check

## 2024-12-18 NOTE — PROGRESS NOTES
HPI:     Jacquelyn Jerry is a 34 year old female presents for    Med check.  Was seen 1 mo ago and had dose of wegovy increased to 1 mg subcutaneous weekly.  Has gained 1 lb over the past month.  Believes it's water weight.  Didn't eat anything today, only drank water.  No side effects w/ medication.  Would like to go up on medication dose.  Has previously been on 1.7 mg dose.  Denies chest pain or palpitations.  No n/v.  No diarrhea or constipation.       Medications (Active prior to today's visit):  Current Outpatient Medications   Medication Sig Dispense Refill    semaglutide-weight management (WEGOVY) 1 MG/0.5ML Subcutaneous Solution Auto-injector Inject 0.5 mL (1 mg total) into the skin once a week. 4 each 0    ibuprofen 600 MG Oral Tab Take 1 tablet (600 mg total) by mouth every 6 (six) hours as needed for Pain. 12 tablet 0    ALPRAZolam (XANAX) 0.25 MG Oral Tab Take 1 tablet (0.25 mg total) by mouth nightly as needed. 30 tablet 0     Allergies:  Allergies[1]    PSFH elements reviewed from today and agreed except as otherwise stated in HPI.  ROS:      Pertinent positives and negatives noted in the the HPI.    PHYSICAL EXAM:     Vitals:    12/18/24 1412   BP: 118/88   BP Location: Left arm   Patient Position: Sitting   Cuff Size: adult   Pulse: 105   Resp: 18   Temp: 98 °F (36.7 °C)   TempSrc: Temporal   SpO2: 97%   Weight: 195 lb 3.2 oz (88.5 kg)   Height: 5' 3.2\" (1.605 m)     Vital signs reviewed.Appears stated age, well groomed.  Physical Exam  Constitutional:       General: She is awake.      Appearance: Normal appearance. She is well-developed.   Cardiovascular:      Rate and Rhythm: Normal rate and regular rhythm.      Pulses: Normal pulses.      Heart sounds: No murmur heard.     No friction rub. No gallop.   Pulmonary:      Effort: Pulmonary effort is normal. No respiratory distress.      Breath sounds: No wheezing, rhonchi or rales.   Abdominal:      General: Bowel sounds are normal. There  is no distension.      Palpations: Abdomen is soft.      Tenderness: There is no abdominal tenderness.      Comments: obese   Musculoskeletal:         General: No tenderness.      Cervical back: Neck supple.      Right lower leg: No edema.      Left lower leg: No edema.   Skin:     General: Skin is warm.   Neurological:      General: No focal deficit present.      Mental Status: She is alert.   Psychiatric:         Mood and Affect: Mood normal.         Speech: Speech normal.         Behavior: Behavior normal. Behavior is cooperative.        ASSESSMENT/PLAN:   34 year old female with    1. Obesity, unspecified class, unspecified obesity type, unspecified whether serious comorbidity present    2. Weight loss counseling, encounter for       Obesity w/ exacerbation.  Increase wegovy to 1.7 mg subcutaneous weekly.  Side effects discussed  Explained that starting January 1 her insurance will no longer cover weight loss medication and that she has a comorbidity with a BMI of 35 or a BMI over 40.  Her options would be doing [facility] direct or going to the weight loss clinic.  Patient works with the pharmacist at [facility] and he has previously discussed using a coupon card with her.  She wants to discuss with him first, she will be out-of-pocket starting January 1.  Follow-up 1 month for med check on Wegovy 1.7 mg or sooner if needed      The patient and provider have a longitudinal relationship to address/treat the serious or complex condition as stated in this encounter.     are no barriers to learning. Medical education done.   Outcome: Patient verbalizes understanding and agrees with plan. Advised to call or RTC if symptoms persist or worsen.    Mariana Billings DO, 12/19/24, 7:25 AM    Patient understands plan and follow-up.           [1] No Known Allergies

## 2024-12-26 ENCOUNTER — TELEPHONE (OUTPATIENT)
Dept: FAMILY MEDICINE CLINIC | Facility: CLINIC | Age: 34
End: 2024-12-26

## 2024-12-26 NOTE — TELEPHONE ENCOUNTER
Patient calling to see if a prior authorization was submitted for her wegovy?  She said she received a message that it was approved.

## 2024-12-27 RX ORDER — SEMAGLUTIDE 1.7 MG/.75ML
1.7 INJECTION, SOLUTION SUBCUTANEOUS WEEKLY
COMMUNITY
Start: 2024-12-18

## 2024-12-27 RX ORDER — SEMAGLUTIDE 1.7 MG/.75ML
1.7 INJECTION, SOLUTION SUBCUTANEOUS WEEKLY
Qty: 3 ML | Refills: 0 | OUTPATIENT
Start: 2024-12-27

## 2024-12-27 NOTE — TELEPHONE ENCOUNTER
Pt states she received a call from [employer] stating medication in approved till 7/2025.  I do not see that we did a PA but Pt picked up script already

## 2025-01-20 ENCOUNTER — TELEPHONE (OUTPATIENT)
Dept: FAMILY MEDICINE CLINIC | Facility: CLINIC | Age: 35
End: 2025-01-20

## 2025-01-20 ENCOUNTER — OFFICE VISIT (OUTPATIENT)
Dept: FAMILY MEDICINE CLINIC | Facility: CLINIC | Age: 35
End: 2025-01-20
Payer: COMMERCIAL

## 2025-01-20 VITALS
RESPIRATION RATE: 18 BRPM | DIASTOLIC BLOOD PRESSURE: 86 MMHG | BODY MASS INDEX: 33.25 KG/M2 | TEMPERATURE: 98 F | HEART RATE: 100 BPM | OXYGEN SATURATION: 95 % | HEIGHT: 63.2 IN | SYSTOLIC BLOOD PRESSURE: 132 MMHG | WEIGHT: 190 LBS

## 2025-01-20 DIAGNOSIS — Z71.3 WEIGHT LOSS COUNSELING, ENCOUNTER FOR: ICD-10-CM

## 2025-01-20 DIAGNOSIS — E66.9 OBESITY, UNSPECIFIED CLASS, UNSPECIFIED OBESITY TYPE, UNSPECIFIED WHETHER SERIOUS COMORBIDITY PRESENT: Primary | ICD-10-CM

## 2025-01-20 PROCEDURE — 99214 OFFICE O/P EST MOD 30 MIN: CPT | Performed by: FAMILY MEDICINE

## 2025-01-20 NOTE — TELEPHONE ENCOUNTER
Patient calling said she needs a prior authorization for wegovy and needs it sent to the [facility].  Patient would like a call back when complete

## 2025-01-20 NOTE — PROGRESS NOTES
HPI:     Jacquelyn Jerry is a 34 year old female presents for    Med check.  Was seen 1 mo ago and had dose of wegovy increased to 1.7 mg subcutaneous weekly.  Has lost 5 pounds over the past month.  Has been eating a lot healthier and working out.  Received a phone call message from her insurance she please me during the visit today saying that her medication has not been approved until at least July of this year.  No side effects w/ medication.  Would like to go up on medication dose.  Denies chest pain or palpitations.  No n/v.  No diarrhea or constipation.       Medications (Active prior to today's visit):  Current Outpatient Medications   Medication Sig Dispense Refill    semaglutide-weight management 2.4 MG/0.75ML Subcutaneous Solution Auto-injector Inject 0.75 mL (2.4 mg total) into the skin once a week for 4 doses. 3 mL 0    NEW DAY 1.5 MG Oral Tab Take 1 tablet (1.5 mg total) by mouth once.      ibuprofen 600 MG Oral Tab Take 1 tablet (600 mg total) by mouth every 6 (six) hours as needed for Pain. 12 tablet 0    ALPRAZolam (XANAX) 0.25 MG Oral Tab Take 1 tablet (0.25 mg total) by mouth nightly as needed. 30 tablet 0     Allergies:  Allergies[1]    PSFH elements reviewed from today and agreed except as otherwise stated in HPI.  ROS:      Pertinent positives and negatives noted in the the HPI.    PHYSICAL EXAM:     Vitals:    01/20/25 1022   BP: 132/86   BP Location: Left arm   Patient Position: Sitting   Cuff Size: adult   Pulse: 100   Resp: 18   Temp: 97.5 °F (36.4 °C)   TempSrc: Temporal   SpO2: 95%   Weight: 190 lb (86.2 kg)   Height: 5' 3.2\" (1.605 m)     Vital signs reviewed.Appears stated age, well groomed.  Physical Exam  Constitutional:       General: She is awake.      Appearance: Normal appearance. She is well-developed.   Cardiovascular:      Rate and Rhythm: Normal rate and regular rhythm.      Pulses: Normal pulses.      Heart sounds: No murmur heard.     No friction rub. No gallop.    Pulmonary:      Effort: Pulmonary effort is normal. No respiratory distress.      Breath sounds: No wheezing, rhonchi or rales.   Abdominal:      General: Bowel sounds are normal. There is no distension.      Palpations: Abdomen is soft.      Tenderness: There is no abdominal tenderness.      Comments: obese   Musculoskeletal:         General: No tenderness.      Cervical back: Neck supple.      Right lower leg: No edema.      Left lower leg: No edema.   Skin:     General: Skin is warm.   Neurological:      General: No focal deficit present.      Mental Status: She is alert.   Psychiatric:         Mood and Affect: Mood normal.         Speech: Speech normal.         Behavior: Behavior normal. Behavior is cooperative.        ASSESSMENT/PLAN:   34 year old female with    1. Obesity, unspecified class, unspecified obesity type, unspecified whether serious comorbidity present    2. Weight loss counseling, encounter for         Obesity w/ exacerbation.  Increase wegovy to 2.4 mg subcutaneous weekly.  Side effects discussed  Insurance has called patient and left her voicemail saying they will approve the medication until at least July of this year.  Continue to work on healthy eating habits and continue to exercise  Follow-up 1 month for med check on Wegovy 2.4 mg or sooner if needed.  Explained that this is the max dose of this medication      The patient and provider have a longitudinal relationship to address/treat the serious or complex condition as stated in this encounter.     are no barriers to learning. Medical education done.   Outcome: Patient verbalizes understanding and agrees with plan. Advised to call or RTC if symptoms persist or worsen.  Mariana Billings DO, 01/20/25, 10:45 AM      Patient understands plan and follow-up.         [1] No Known Allergies

## 2025-01-20 NOTE — TELEPHONE ENCOUNTER
Script sent to [facility]  Prior authorization submitted to patient's plan via Epic on 1/20/2025. Await response.

## 2025-01-23 RX ORDER — TIRZEPATIDE 2.5 MG/.5ML
2.5 INJECTION, SOLUTION SUBCUTANEOUS WEEKLY
Qty: 2 ML | Refills: 0 | Status: CANCELLED | OUTPATIENT
Start: 2025-01-23 | End: 2025-02-14

## 2025-01-23 NOTE — TELEPHONE ENCOUNTER
Pt called office stating that her PA for wegovy was denied and she wants to know what are her other options.

## 2025-01-23 NOTE — TELEPHONE ENCOUNTER
She no longer meets criteria to be on this medication as we told her would happen with the [employer] insurance plan

## 2025-03-29 ENCOUNTER — OFFICE VISIT (OUTPATIENT)
Dept: FAMILY MEDICINE CLINIC | Facility: CLINIC | Age: 35
End: 2025-03-29
Payer: COMMERCIAL

## 2025-03-29 VITALS
RESPIRATION RATE: 18 BRPM | TEMPERATURE: 98 F | HEIGHT: 62 IN | OXYGEN SATURATION: 99 % | HEART RATE: 105 BPM | SYSTOLIC BLOOD PRESSURE: 132 MMHG | DIASTOLIC BLOOD PRESSURE: 84 MMHG | WEIGHT: 189 LBS | BODY MASS INDEX: 34.78 KG/M2

## 2025-03-29 DIAGNOSIS — J06.9 URI WITH COUGH AND CONGESTION: Primary | ICD-10-CM

## 2025-03-29 DIAGNOSIS — Z20.828 EXPOSURE TO INFLUENZA: ICD-10-CM

## 2025-03-29 PROCEDURE — 99213 OFFICE O/P EST LOW 20 MIN: CPT | Performed by: NURSE PRACTITIONER

## 2025-03-29 RX ORDER — ONDANSETRON 4 MG/1
4 TABLET, ORALLY DISINTEGRATING ORAL EVERY 8 HOURS PRN
COMMUNITY
Start: 2025-03-20 | End: 2025-03-29

## 2025-03-29 RX ORDER — BENZONATATE 200 MG/1
200 CAPSULE ORAL 3 TIMES DAILY PRN
Qty: 21 CAPSULE | Refills: 0 | Status: SHIPPED | OUTPATIENT
Start: 2025-03-29

## 2025-03-29 RX ORDER — CODEINE PHOSPHATE AND GUAIFENESIN 10; 100 MG/5ML; MG/5ML
10 SOLUTION ORAL NIGHTLY PRN
Qty: 70 ML | Refills: 0 | Status: SHIPPED | OUTPATIENT
Start: 2025-03-29 | End: 2025-04-05

## 2025-03-29 NOTE — PROGRESS NOTES
Subjective:   Patient ID: Jacquelyn Jerry is a 34 year old female.    Patient is a 34 year old female who presents today with complaints of cough, runny nose, nasal congestion, scratchy throat, fatigue x 1 week. Coughing so hard she's gagging on mucus. Having trouble sleeping at night due to the cough. Denies sore throat, ear pain, fever, body aches, chills, n/v/d, abdominal pain, SOB or wheezing. Tolerating PO well at home. Attempted treatment prior to arrival = Delsym. Daughter + Flu.        History/Other:   Review of Systems   Constitutional:  Positive for fatigue. Negative for appetite change, chills and fever.   HENT:  Positive for congestion and rhinorrhea. Negative for ear pain and sore throat.    Respiratory:  Positive for cough. Negative for shortness of breath and wheezing.    Gastrointestinal:  Negative for abdominal pain, diarrhea, nausea and vomiting.   Musculoskeletal:  Negative for myalgias.     Current Outpatient Medications   Medication Sig Dispense Refill    benzonatate 200 MG Oral Cap Take 1 capsule (200 mg total) by mouth 3 (three) times daily as needed for cough. 21 capsule 0    guaiFENesin-codeine 100-10 MG/5ML Oral Solution Take 10 mL by mouth nightly as needed for cough. 70 mL 0    ibuprofen 600 MG Oral Tab Take 1 tablet (600 mg total) by mouth every 6 (six) hours as needed for Pain. 12 tablet 0    ALPRAZolam (XANAX) 0.25 MG Oral Tab Take 1 tablet (0.25 mg total) by mouth nightly as needed. 30 tablet 0     Allergies:Allergies[1]    /84   Pulse 105   Temp 98.3 °F (36.8 °C) (Temporal)   Resp 18   Ht 5' 2\" (1.575 m)   Wt 189 lb (85.7 kg)   LMP 03/12/2025 (Approximate)   SpO2 99%   BMI 34.57 kg/m²       Objective:   Physical Exam  Vitals reviewed.   Constitutional:       General: She is awake. She is not in acute distress.     Appearance: Normal appearance. She is well-developed and well-groomed. She is not ill-appearing, toxic-appearing or diaphoretic.   HENT:      Head:  Normocephalic and atraumatic.      Right Ear: Ear canal and external ear normal. A middle ear effusion is present.      Left Ear: Ear canal and external ear normal. A middle ear effusion is present.      Nose: Nose normal.      Mouth/Throat:      Lips: Pink.      Mouth: Mucous membranes are moist. No oral lesions.      Pharynx: Oropharynx is clear. Uvula midline. Posterior oropharyngeal erythema and postnasal drip present.   Cardiovascular:      Rate and Rhythm: Normal rate and regular rhythm.   Pulmonary:      Effort: Pulmonary effort is normal. No respiratory distress.      Breath sounds: Normal breath sounds and air entry. No decreased breath sounds, wheezing, rhonchi or rales.   Lymphadenopathy:      Head:      Right side of head: No tonsillar adenopathy.      Left side of head: No tonsillar adenopathy.      Cervical: No cervical adenopathy.   Skin:     General: Skin is warm and dry.   Neurological:      Mental Status: She is alert and oriented to person, place, and time.   Psychiatric:         Behavior: Behavior is cooperative.         Assessment & Plan:   1. URI with cough and congestion    2. Exposure to influenza        No orders of the defined types were placed in this encounter.      Meds This Visit:  Requested Prescriptions     Signed Prescriptions Disp Refills    benzonatate 200 MG Oral Cap 21 capsule 0     Sig: Take 1 capsule (200 mg total) by mouth 3 (three) times daily as needed for cough.    guaiFENesin-codeine 100-10 MG/5ML Oral Solution 70 mL 0     Sig: Take 10 mL by mouth nightly as needed for cough.     Reassuring physical exam findings. Vitals WNL. No sign of bacterial etiology, RDS or dehydration at this time.  Tessalon PRN as prescribed for cough.  Patient notes last time she had a severe cough she was given cough syrup with codeine. Advised to try Tessalon first. If not helping, can fill Rx for guaifenesin-codeine and take only at night to help with sleep.  Supportive care and return to care  measures reviewed.  Patient v/u and is comfortable with this plan.    Patient Instructions   Tylenol and Motrin as needed  Rest, push fluids  Mucinex DM over the counter for nasal congestion/cough  START daily antihistamine (Zyrtec, Claritin, Allegra) and choose one of those. Take daily for 1-2 weeks to help with any post nasal drainage/sore throat  START Flonase nasal spray daily. 1 spray in each nostril  Tessalon as needed as prescribed for cough  If Tessalon not helping, fill Rx for cough syrup and take only at night to help with sleeping/controlling cough  Return to care for new/worsening symptoms           [1] No Known Allergies

## 2025-03-29 NOTE — PATIENT INSTRUCTIONS
Tylenol and Motrin as needed  Rest, push fluids  Mucinex DM over the counter for nasal congestion/cough  START daily antihistamine (Zyrtec, Claritin, Allegra) and choose one of those. Take daily for 1-2 weeks to help with any post nasal drainage/sore throat  START Flonase nasal spray daily. 1 spray in each nostril  Tessalon as needed as prescribed for cough  If Tessalon not helping, fill Rx for cough syrup and take only at night to help with sleeping/controlling cough  Return to care for new/worsening symptoms